# Patient Record
Sex: FEMALE | Race: OTHER | HISPANIC OR LATINO | Employment: UNEMPLOYED | URBAN - METROPOLITAN AREA
[De-identification: names, ages, dates, MRNs, and addresses within clinical notes are randomized per-mention and may not be internally consistent; named-entity substitution may affect disease eponyms.]

---

## 2022-01-10 ENCOUNTER — LAB VISIT (OUTPATIENT)
Dept: PRIMARY CARE CLINIC | Facility: CLINIC | Age: 27
End: 2022-01-10
Payer: OTHER GOVERNMENT

## 2022-01-10 DIAGNOSIS — Z20.822 CONTACT WITH AND (SUSPECTED) EXPOSURE TO COVID-19: ICD-10-CM

## 2022-01-10 LAB
CTP QC/QA: YES
SARS-COV-2 AG RESP QL IA.RAPID: NEGATIVE

## 2022-01-10 PROCEDURE — 87811 SARS-COV-2 COVID19 W/OPTIC: CPT

## 2022-02-24 ENCOUNTER — HOSPITAL ENCOUNTER (OUTPATIENT)
Facility: HOSPITAL | Age: 27
Discharge: HOME | End: 2022-02-28
Attending: EMERGENCY MEDICINE | Admitting: FAMILY MEDICINE

## 2022-02-24 DIAGNOSIS — G43.109 MIGRAINE WITH AURA AND WITHOUT STATUS MIGRAINOSUS, NOT INTRACTABLE: ICD-10-CM

## 2022-02-24 DIAGNOSIS — R10.9 ABDOMINAL PAIN, UNSPECIFIED ABDOMINAL LOCATION: Primary | ICD-10-CM

## 2022-02-24 DIAGNOSIS — K76.1: ICD-10-CM

## 2022-02-24 DIAGNOSIS — R11.2 NAUSEA AND VOMITING, INTRACTABILITY OF VOMITING NOT SPECIFIED, UNSPECIFIED VOMITING TYPE: ICD-10-CM

## 2022-02-24 DIAGNOSIS — R93.2 ABNORMAL LIVER CT: ICD-10-CM

## 2022-02-24 DIAGNOSIS — I50.9 CHF (CONGESTIVE HEART FAILURE): ICD-10-CM

## 2022-02-24 DIAGNOSIS — N17.9 AKI (ACUTE KIDNEY INJURY): ICD-10-CM

## 2022-02-24 DIAGNOSIS — R07.9 CHEST PAIN: ICD-10-CM

## 2022-02-24 DIAGNOSIS — R10.84 GENERALIZED ABDOMINAL PAIN: ICD-10-CM

## 2022-02-24 PROBLEM — D64.9 ANEMIA: Status: ACTIVE | Noted: 2022-02-24

## 2022-02-24 LAB
ALBUMIN SERPL BCP-MCNC: 4.3 G/DL (ref 3.5–5.2)
ALP SERPL-CCNC: 60 U/L (ref 55–135)
ALT SERPL W/O P-5'-P-CCNC: 19 U/L (ref 10–44)
ANION GAP SERPL CALC-SCNC: 9 MMOL/L (ref 8–16)
AST SERPL-CCNC: 16 U/L (ref 10–40)
B-HCG UR QL: NEGATIVE
BACTERIA #/AREA URNS HPF: ABNORMAL /HPF
BASOPHILS # BLD AUTO: 0.04 K/UL (ref 0–0.2)
BASOPHILS NFR BLD: 0.4 % (ref 0–1.9)
BILIRUB SERPL-MCNC: 0.2 MG/DL (ref 0.1–1)
BILIRUB UR QL STRIP: NEGATIVE
BUN SERPL-MCNC: 14 MG/DL (ref 6–20)
CALCIUM SERPL-MCNC: 9.2 MG/DL (ref 8.7–10.5)
CHLORIDE SERPL-SCNC: 104 MMOL/L (ref 95–110)
CLARITY UR: CLEAR
CO2 SERPL-SCNC: 24 MMOL/L (ref 23–29)
COLOR UR: YELLOW
CREAT SERPL-MCNC: 1 MG/DL (ref 0.5–1.4)
CTP QC/QA: YES
CTP QC/QA: YES
DIFFERENTIAL METHOD: ABNORMAL
EOSINOPHIL # BLD AUTO: 0.1 K/UL (ref 0–0.5)
EOSINOPHIL NFR BLD: 0.6 % (ref 0–8)
ERYTHROCYTE [DISTWIDTH] IN BLOOD BY AUTOMATED COUNT: 12.5 % (ref 11.5–14.5)
EST. GFR  (AFRICAN AMERICAN): >60 ML/MIN/1.73 M^2
EST. GFR  (NON AFRICAN AMERICAN): >60 ML/MIN/1.73 M^2
GLUCOSE SERPL-MCNC: 120 MG/DL (ref 70–110)
GLUCOSE UR QL STRIP: NEGATIVE
HCT VFR BLD AUTO: 35.8 % (ref 37–48.5)
HGB BLD-MCNC: 11.9 G/DL (ref 12–16)
HGB UR QL STRIP: ABNORMAL
IMM GRANULOCYTES # BLD AUTO: 0.03 K/UL (ref 0–0.04)
IMM GRANULOCYTES NFR BLD AUTO: 0.3 % (ref 0–0.5)
INR PPP: 1 (ref 0.8–1.2)
KETONES UR QL STRIP: NEGATIVE
LEUKOCYTE ESTERASE UR QL STRIP: ABNORMAL
LIPASE SERPL-CCNC: 12 U/L (ref 4–60)
LYMPHOCYTES # BLD AUTO: 2.4 K/UL (ref 1–4.8)
LYMPHOCYTES NFR BLD: 26.8 % (ref 18–48)
MCH RBC QN AUTO: 33.1 PG (ref 27–31)
MCHC RBC AUTO-ENTMCNC: 33.2 G/DL (ref 32–36)
MCV RBC AUTO: 100 FL (ref 82–98)
MICROSCOPIC COMMENT: ABNORMAL
MONOCYTES # BLD AUTO: 0.5 K/UL (ref 0.3–1)
MONOCYTES NFR BLD: 5.7 % (ref 4–15)
NEUTROPHILS # BLD AUTO: 5.9 K/UL (ref 1.8–7.7)
NEUTROPHILS NFR BLD: 66.2 % (ref 38–73)
NITRITE UR QL STRIP: NEGATIVE
NRBC BLD-RTO: 0 /100 WBC
PH UR STRIP: 7 [PH] (ref 5–8)
PLATELET # BLD AUTO: 288 K/UL (ref 150–450)
PMV BLD AUTO: 9.3 FL (ref 9.2–12.9)
POTASSIUM SERPL-SCNC: 3.9 MMOL/L (ref 3.5–5.1)
PROT SERPL-MCNC: 7.4 G/DL (ref 6–8.4)
PROT UR QL STRIP: ABNORMAL
PROTHROMBIN TIME: 10.3 SEC (ref 9–12.5)
RBC # BLD AUTO: 3.59 M/UL (ref 4–5.4)
RBC #/AREA URNS HPF: >100 /HPF (ref 0–4)
SARS-COV-2 RDRP RESP QL NAA+PROBE: NEGATIVE
SODIUM SERPL-SCNC: 137 MMOL/L (ref 136–145)
SP GR UR STRIP: >1.03 (ref 1–1.03)
SQUAMOUS #/AREA URNS HPF: 0 /HPF
URN SPEC COLLECT METH UR: ABNORMAL
UROBILINOGEN UR STRIP-ACNC: NEGATIVE EU/DL
WBC # BLD AUTO: 8.95 K/UL (ref 3.9–12.7)
WBC #/AREA URNS HPF: 2 /HPF (ref 0–5)

## 2022-02-24 PROCEDURE — U0002 COVID-19 LAB TEST NON-CDC: HCPCS | Performed by: NURSE PRACTITIONER

## 2022-02-24 PROCEDURE — 63600175 PHARM REV CODE 636 W HCPCS: Performed by: NURSE PRACTITIONER

## 2022-02-24 PROCEDURE — 25500020 PHARM REV CODE 255: Performed by: NURSE PRACTITIONER

## 2022-02-24 PROCEDURE — 25000003 PHARM REV CODE 250

## 2022-02-24 PROCEDURE — 80053 COMPREHEN METABOLIC PANEL: CPT | Performed by: NURSE PRACTITIONER

## 2022-02-24 PROCEDURE — 96361 HYDRATE IV INFUSION ADD-ON: CPT

## 2022-02-24 PROCEDURE — 96375 TX/PRO/DX INJ NEW DRUG ADDON: CPT

## 2022-02-24 PROCEDURE — 85610 PROTHROMBIN TIME: CPT | Performed by: NURSE PRACTITIONER

## 2022-02-24 PROCEDURE — 80074 ACUTE HEPATITIS PANEL: CPT | Performed by: NURSE PRACTITIONER

## 2022-02-24 PROCEDURE — G0378 HOSPITAL OBSERVATION PER HR: HCPCS

## 2022-02-24 PROCEDURE — 85025 COMPLETE CBC W/AUTO DIFF WBC: CPT | Performed by: NURSE PRACTITIONER

## 2022-02-24 PROCEDURE — 25000003 PHARM REV CODE 250: Performed by: NURSE PRACTITIONER

## 2022-02-24 PROCEDURE — 99285 EMERGENCY DEPT VISIT HI MDM: CPT | Mod: 25

## 2022-02-24 PROCEDURE — 96374 THER/PROPH/DIAG INJ IV PUSH: CPT | Mod: 59

## 2022-02-24 PROCEDURE — 63600175 PHARM REV CODE 636 W HCPCS

## 2022-02-24 PROCEDURE — 81000 URINALYSIS NONAUTO W/SCOPE: CPT | Performed by: NURSE PRACTITIONER

## 2022-02-24 PROCEDURE — 81025 URINE PREGNANCY TEST: CPT | Performed by: NURSE PRACTITIONER

## 2022-02-24 PROCEDURE — 83690 ASSAY OF LIPASE: CPT | Performed by: NURSE PRACTITIONER

## 2022-02-24 RX ORDER — PROCHLORPERAZINE EDISYLATE 5 MG/ML
5 INJECTION INTRAMUSCULAR; INTRAVENOUS EVERY 6 HOURS PRN
Status: DISCONTINUED | OUTPATIENT
Start: 2022-02-24 | End: 2022-02-28 | Stop reason: HOSPADM

## 2022-02-24 RX ORDER — KETOROLAC TROMETHAMINE 30 MG/ML
15 INJECTION, SOLUTION INTRAMUSCULAR; INTRAVENOUS
Status: COMPLETED | OUTPATIENT
Start: 2022-02-24 | End: 2022-02-24

## 2022-02-24 RX ORDER — ACETAMINOPHEN 325 MG/1
650 TABLET ORAL EVERY 4 HOURS PRN
Status: DISCONTINUED | OUTPATIENT
Start: 2022-02-24 | End: 2022-02-28 | Stop reason: HOSPADM

## 2022-02-24 RX ORDER — SODIUM CHLORIDE 0.9 % (FLUSH) 0.9 %
10 SYRINGE (ML) INJECTION EVERY 8 HOURS PRN
Status: DISCONTINUED | OUTPATIENT
Start: 2022-02-24 | End: 2022-02-28 | Stop reason: HOSPADM

## 2022-02-24 RX ORDER — TALC
6 POWDER (GRAM) TOPICAL NIGHTLY PRN
Status: DISCONTINUED | OUTPATIENT
Start: 2022-02-24 | End: 2022-02-28 | Stop reason: HOSPADM

## 2022-02-24 RX ORDER — GLUCAGON 1 MG
1 KIT INJECTION
Status: DISCONTINUED | OUTPATIENT
Start: 2022-02-24 | End: 2022-02-28 | Stop reason: HOSPADM

## 2022-02-24 RX ORDER — ONDANSETRON 2 MG/ML
4 INJECTION INTRAMUSCULAR; INTRAVENOUS EVERY 8 HOURS PRN
Status: DISCONTINUED | OUTPATIENT
Start: 2022-02-24 | End: 2022-02-28 | Stop reason: HOSPADM

## 2022-02-24 RX ORDER — SODIUM CHLORIDE, SODIUM LACTATE, POTASSIUM CHLORIDE, CALCIUM CHLORIDE 600; 310; 30; 20 MG/100ML; MG/100ML; MG/100ML; MG/100ML
INJECTION, SOLUTION INTRAVENOUS CONTINUOUS
Status: ACTIVE | OUTPATIENT
Start: 2022-02-24 | End: 2022-02-25

## 2022-02-24 RX ORDER — SIMETHICONE 80 MG
1 TABLET,CHEWABLE ORAL 4 TIMES DAILY PRN
Status: DISCONTINUED | OUTPATIENT
Start: 2022-02-24 | End: 2022-02-28 | Stop reason: HOSPADM

## 2022-02-24 RX ORDER — MAG HYDROX/ALUMINUM HYD/SIMETH 200-200-20
30 SUSPENSION, ORAL (FINAL DOSE FORM) ORAL 4 TIMES DAILY PRN
Status: DISCONTINUED | OUTPATIENT
Start: 2022-02-24 | End: 2022-02-28 | Stop reason: HOSPADM

## 2022-02-24 RX ORDER — MORPHINE SULFATE 4 MG/ML
4 INJECTION, SOLUTION INTRAMUSCULAR; INTRAVENOUS
Status: COMPLETED | OUTPATIENT
Start: 2022-02-24 | End: 2022-02-24

## 2022-02-24 RX ORDER — IBUPROFEN 200 MG
24 TABLET ORAL
Status: DISCONTINUED | OUTPATIENT
Start: 2022-02-24 | End: 2022-02-28 | Stop reason: HOSPADM

## 2022-02-24 RX ORDER — SODIUM CHLORIDE 9 MG/ML
INJECTION, SOLUTION INTRAVENOUS CONTINUOUS
Status: DISCONTINUED | OUTPATIENT
Start: 2022-02-24 | End: 2022-02-24

## 2022-02-24 RX ORDER — ONDANSETRON 2 MG/ML
4 INJECTION INTRAMUSCULAR; INTRAVENOUS
Status: COMPLETED | OUTPATIENT
Start: 2022-02-24 | End: 2022-02-24

## 2022-02-24 RX ORDER — IBUPROFEN 200 MG
16 TABLET ORAL
Status: DISCONTINUED | OUTPATIENT
Start: 2022-02-24 | End: 2022-02-28 | Stop reason: HOSPADM

## 2022-02-24 RX ADMIN — ONDANSETRON 4 MG: 2 INJECTION INTRAMUSCULAR; INTRAVENOUS at 02:02

## 2022-02-24 RX ADMIN — SODIUM CHLORIDE, SODIUM LACTATE, POTASSIUM CHLORIDE, AND CALCIUM CHLORIDE 1000 ML: .6; .31; .03; .02 INJECTION, SOLUTION INTRAVENOUS at 08:02

## 2022-02-24 RX ADMIN — MORPHINE SULFATE 4 MG: 4 INJECTION INTRAVENOUS at 05:02

## 2022-02-24 RX ADMIN — KETOROLAC TROMETHAMINE 15 MG: 30 INJECTION, SOLUTION INTRAMUSCULAR at 03:02

## 2022-02-24 RX ADMIN — IOHEXOL 75 ML: 350 INJECTION, SOLUTION INTRAVENOUS at 03:02

## 2022-02-24 RX ADMIN — SODIUM CHLORIDE: 0.9 INJECTION, SOLUTION INTRAVENOUS at 08:02

## 2022-02-24 RX ADMIN — SODIUM CHLORIDE, SODIUM LACTATE, POTASSIUM CHLORIDE, AND CALCIUM CHLORIDE: .6; .31; .03; .02 INJECTION, SOLUTION INTRAVENOUS at 11:02

## 2022-02-24 RX ADMIN — SODIUM CHLORIDE 1000 ML: 0.9 INJECTION, SOLUTION INTRAVENOUS at 02:02

## 2022-02-24 NOTE — ED PROVIDER NOTES
Encounter Date: 2/24/2022       History     Chief Complaint   Patient presents with    Abdominal Pain     Generalized abd pain with n/v that began last night, last ate-3 hours ago, last BM this am, denies pain with urination, pain describes as cramping      27 yr old female presents to the ER with reports of right lower abd pain with radiation upward to the right upper quad that began last night. Pt states the pain is intense and intermittent however has been more constant over the past few hours. + nausea and vomiting. Denies diarrhea however states LBM was this morning with no blood reported. Denies fever. Pt states she is also on her cycle at this time and typically has mild cramping however nothing like present. No PMH reported.     The history is provided by the patient. The history is limited by a language barrier. A  was used.     Review of patient's allergies indicates:  No Known Allergies  No past medical history on file.  No past surgical history on file.  No family history on file.     Review of Systems   Constitutional: Negative for fever.   Respiratory: Negative for shortness of breath.    Cardiovascular: Negative for chest pain, palpitations and leg swelling.   Gastrointestinal: Positive for abdominal pain, nausea and vomiting.   Genitourinary: Positive for vaginal bleeding. Negative for dysuria.   Musculoskeletal: Negative for neck pain and neck stiffness.       Physical Exam     Initial Vitals [02/24/22 1334]   BP Pulse Resp Temp SpO2   (!) 148/91 75 18 97.7 °F (36.5 °C) 100 %      MAP       --         Physical Exam    Constitutional: She appears well-developed and well-nourished. She appears ill.   Uncomfortable in appearance   HENT:   Head: Normocephalic.   Right Ear: Hearing and tympanic membrane normal.   Left Ear: Hearing and tympanic membrane normal.   Nose: Nose normal.   Mouth/Throat: Oropharynx is clear and moist.   Eyes: Lids are normal. Pupils are equal, round, and  reactive to light.   Neck:   Normal range of motion.  Cardiovascular: Normal rate.   Pulmonary/Chest: Breath sounds normal. No respiratory distress. She has no wheezes. She has no rhonchi.   Abdominal: Abdomen is soft. There is abdominal tenderness in the right upper quadrant and right lower quadrant.   Musculoskeletal:         General: Normal range of motion.      Cervical back: Normal range of motion. No rigidity.     Neurological: She is alert and oriented to person, place, and time.   Skin: Skin is warm and dry. No rash noted.   Psychiatric: She has a normal mood and affect. Her behavior is normal. Judgment and thought content normal.         ED Course   Procedures  Labs Reviewed   URINALYSIS, REFLEX TO URINE CULTURE - Abnormal; Notable for the following components:       Result Value    Specific Gravity, UA >1.030 (*)     Protein, UA Trace (*)     Occult Blood UA 3+ (*)     Leukocytes, UA 1+ (*)     All other components within normal limits    Narrative:     Specimen Source->Urine   CBC W/ AUTO DIFFERENTIAL - Abnormal; Notable for the following components:    RBC 3.59 (*)     Hemoglobin 11.9 (*)     Hematocrit 35.8 (*)      (*)     MCH 33.1 (*)     All other components within normal limits   COMPREHENSIVE METABOLIC PANEL - Abnormal; Notable for the following components:    Glucose 120 (*)     All other components within normal limits   URINALYSIS MICROSCOPIC - Abnormal; Notable for the following components:    RBC, UA >100 (*)     All other components within normal limits    Narrative:     Specimen Source->Urine   LIPASE   PROTIME-INR   HEPATITIS PANEL, ACUTE   POCT URINE PREGNANCY   SARS-COV-2 RDRP GENE          Imaging Results           CT Abdomen Pelvis With Contrast (Final result)  Result time 02/24/22 16:30:45    Final result by Demario Rodriguez MD (02/24/22 16:30:45)                 Impression:      1. No localized findings identified to explain patient's symptoms of right lower quadrant pain.   "Specifically, appendix and terminal ileum appear normal.  2. Hepatomegaly with nonspecific periportal edema and heterogeneous parenchymal attenuation/slight "nutmeg" liver configuration.  Differential considerations more commonly seen in this age group can include sequela of acute hepatitis versus hepatic veno-occlusive versus sequela of Budd-Chiari syndrome/hepatic venous and/or IVC thrombosis, although difficult to confirm secondary to timing of contrast bolus and image acquisition.  Correlation with LFTs advised.  Sequela of congestive hepatopathy from CHF, constrictive pericarditis or pulmonary hypertension considered much less likely given the relative normal appearance of the base of the heart and lack of any significant basilar pulmonary edema or pleural effusion.  No discrete mass seen at the domitila hepatis to suggest a cause for potential obstruction of lymphatic drainage, and no discrete mass seen within the biliary system.  No definitive CT morphologic findings to suggest primary sclerosing cholangitis at this time.  3. Left hepatic lobe subcentimeter enhancing focus, too small to characterize.  4. Few additional findings as above.  This report was flagged in Epic as abnormal.      Electronically signed by: Demario Rodriguez MD  Date:    02/24/2022  Time:    16:30             Narrative:    EXAMINATION:  CT ABDOMEN PELVIS WITH CONTRAST    CLINICAL HISTORY:  RLQ abdominal pain (Age >= 14y);    TECHNIQUE:  Low dose axial images, sagittal and coronal reformations were obtained from the lung bases to the pubic symphysis following the IV administration of 75 mL of Omnipaque 350 .  Oral contrast was not given.    COMPARISON:  None.    FINDINGS:  Imaged lung bases are clear.  Base of the heart is within normal limits.    Liver is enlarged measuring 23 cm in maximum coronal length.  There is heterogeneous parenchymal attenuation with somewhat mottled pattern of contrast enhancement and patchy areas of decreased " enhancement of the liver periphery (nutmeg appearance) throughout the liver.  There is diffuse periportal edema.  Small geographic area of hypoattenuation at the anterior left hepatic lobe suggesting focal fatty infiltration.  2 mm enhancing focus at the lateral left hepatic lobe, which is too small to characterize.    Main portal vein appears patent.  Evaluation of the IVC is limited secondary to timing of contrast bolus mainly tailored for the portal venous system.  Hepatic veins could not be assessed.    Gallbladder appears somewhat contracted with suspected pericholecystic fluid versus asymmetric wall thickening along the lateral aspect.  No radiodense large gallstone seen.  No biliary ductal dilatation.    Pancreas, spleen, stomach, duodenum and bilateral adrenal glands are within normal limits.    Bilateral kidneys are normal in size, shape and location with relatively symmetric enhancement.  No hydronephrosis or significant perinephric stranding.  Ureters are nondilated.  Urinary bladder is suboptimally distended.  Uterus is retroflexed and tilted towards the right.  No adnexal mass definitively seen.  There is suspected small volume nonspecific free pelvic fluid, commonly physiologic in this age group.    No large volume abdominal ascites, free air or lymphadenopathy by CT criteria.    Appendix and terminal ileum are within normal limits.  Mild amount of scattered fecal material throughout the colon.  No evidence of bowel obstruction or inflammation.  No pneumatosis or portal venous gas.    Small fat containing umbilical hernia.    Osseous structures show minimal degenerative change without acute or destructive process seen.                                 Medications   morphine injection 4 mg (has no administration in time range)   ondansetron injection 4 mg (4 mg Intravenous Given 2/24/22 1423)   sodium chloride 0.9% bolus 1,000 mL (0 mLs Intravenous Stopped 2/24/22 1536)   ketorolac injection 15 mg (15 mg  Intravenous Given 2/24/22 1506)   iohexoL (OMNIPAQUE 350) injection 75 mL (75 mLs Intravenous Given 2/24/22 1534)     Medical Decision Making:   Clinical Tests:   Lab Tests: Reviewed and Ordered  Radiological Study: Ordered and Reviewed  Other:   I discussed test(s) with the performing physician.             ED Course as of 02/24/22 1735   Thu Feb 24, 2022   1610 CT is pending.  [DT]   1637 [facility] called at this time. VM left.  [DT]   1637 I spoke with the pt in great detail concerning her CT and labs findings. Pt states she has never had issues in the past with her liver. Awaiting a call back from Dr Davenport at this time. In addition, the pt will be given an additional dose of pain meds as she states the pain has returned.  [DT]   1656 [facility] paged.  [DT]   1715 [facility] is capped. Waiting on a call back from [facility]  which was paged 5 min ago [DT]   1715 Secure chat sent to Dr Davenport.  [DT]   1723 Spoke with Dr Davenport concerning the pts condition and CT findings. States he will be in touch with further orders.  [DT]   1725 [facility] paged again as I was on the line with Dr Davenport when they called back [DT]      ED Course User Index  [DT] Meghana Leigh NP             Clinical Impression:   Final diagnoses:  [R10.9] Abdominal pain, unspecified abdominal location (Primary)  [R93.2] Abnormal liver CT          ED Disposition Condition    Observation               Meghana Leigh NP  02/24/22 1727       Meghana Leigh NP  02/24/22 1736

## 2022-02-25 LAB
ALBUMIN SERPL BCP-MCNC: 3.2 G/DL (ref 3.5–5.2)
ALP SERPL-CCNC: 56 U/L (ref 55–135)
ALT SERPL W/O P-5'-P-CCNC: 48 U/L (ref 10–44)
ANION GAP SERPL CALC-SCNC: 7 MMOL/L (ref 8–16)
AST SERPL-CCNC: 32 U/L (ref 10–40)
BASOPHILS # BLD AUTO: 0.03 K/UL (ref 0–0.2)
BASOPHILS NFR BLD: 0.4 % (ref 0–1.9)
BILIRUB SERPL-MCNC: 0.4 MG/DL (ref 0.1–1)
BUN SERPL-MCNC: 7 MG/DL (ref 6–20)
CALCIUM SERPL-MCNC: 8.1 MG/DL (ref 8.7–10.5)
CHLORIDE SERPL-SCNC: 109 MMOL/L (ref 95–110)
CO2 SERPL-SCNC: 23 MMOL/L (ref 23–29)
CREAT SERPL-MCNC: 0.6 MG/DL (ref 0.5–1.4)
DIFFERENTIAL METHOD: ABNORMAL
EOSINOPHIL # BLD AUTO: 0.1 K/UL (ref 0–0.5)
EOSINOPHIL NFR BLD: 0.9 % (ref 0–8)
ERYTHROCYTE [DISTWIDTH] IN BLOOD BY AUTOMATED COUNT: 12.6 % (ref 11.5–14.5)
EST. GFR  (AFRICAN AMERICAN): >60 ML/MIN/1.73 M^2
EST. GFR  (NON AFRICAN AMERICAN): >60 ML/MIN/1.73 M^2
FOLATE SERPL-MCNC: 10.8 NG/ML (ref 4–24)
GLUCOSE SERPL-MCNC: 83 MG/DL (ref 70–110)
HAV IGM SERPL QL IA: NEGATIVE
HBV CORE IGM SERPL QL IA: NEGATIVE
HBV SURFACE AG SERPL QL IA: NEGATIVE
HCT VFR BLD AUTO: 31.9 % (ref 37–48.5)
HCV AB SERPL QL IA: NEGATIVE
HGB BLD-MCNC: 10.5 G/DL (ref 12–16)
IMM GRANULOCYTES # BLD AUTO: 0.03 K/UL (ref 0–0.04)
IMM GRANULOCYTES NFR BLD AUTO: 0.4 % (ref 0–0.5)
LYMPHOCYTES # BLD AUTO: 2.9 K/UL (ref 1–4.8)
LYMPHOCYTES NFR BLD: 38.2 % (ref 18–48)
MCH RBC QN AUTO: 33 PG (ref 27–31)
MCHC RBC AUTO-ENTMCNC: 32.9 G/DL (ref 32–36)
MCV RBC AUTO: 100 FL (ref 82–98)
MONOCYTES # BLD AUTO: 0.5 K/UL (ref 0.3–1)
MONOCYTES NFR BLD: 7 % (ref 4–15)
NEUTROPHILS # BLD AUTO: 4 K/UL (ref 1.8–7.7)
NEUTROPHILS NFR BLD: 53.1 % (ref 38–73)
NRBC BLD-RTO: 0 /100 WBC
PLATELET # BLD AUTO: 246 K/UL (ref 150–450)
PMV BLD AUTO: 9.8 FL (ref 9.2–12.9)
POTASSIUM SERPL-SCNC: 3.5 MMOL/L (ref 3.5–5.1)
PROT SERPL-MCNC: 5.7 G/DL (ref 6–8.4)
RBC # BLD AUTO: 3.18 M/UL (ref 4–5.4)
RETICS/RBC NFR AUTO: 2 % (ref 0.5–2.5)
SODIUM SERPL-SCNC: 139 MMOL/L (ref 136–145)
TSH SERPL DL<=0.005 MIU/L-ACNC: 0.81 UIU/ML (ref 0.4–4)
VIT B12 SERPL-MCNC: 768 PG/ML (ref 210–950)
WBC # BLD AUTO: 7.47 K/UL (ref 3.9–12.7)

## 2022-02-25 PROCEDURE — G0378 HOSPITAL OBSERVATION PER HR: HCPCS

## 2022-02-25 PROCEDURE — 80053 COMPREHEN METABOLIC PANEL: CPT

## 2022-02-25 PROCEDURE — 85045 AUTOMATED RETICULOCYTE COUNT: CPT

## 2022-02-25 PROCEDURE — 85025 COMPLETE CBC W/AUTO DIFF WBC: CPT

## 2022-02-25 PROCEDURE — 99204 PR OFFICE/OUTPT VISIT, NEW, LEVL IV, 45-59 MIN: ICD-10-PCS | Mod: ,,, | Performed by: INTERNAL MEDICINE

## 2022-02-25 PROCEDURE — 96361 HYDRATE IV INFUSION ADD-ON: CPT

## 2022-02-25 PROCEDURE — 82607 VITAMIN B-12: CPT

## 2022-02-25 PROCEDURE — 96375 TX/PRO/DX INJ NEW DRUG ADDON: CPT

## 2022-02-25 PROCEDURE — 63600175 PHARM REV CODE 636 W HCPCS: Performed by: INTERNAL MEDICINE

## 2022-02-25 PROCEDURE — 99204 OFFICE O/P NEW MOD 45 MIN: CPT | Mod: ,,, | Performed by: INTERNAL MEDICINE

## 2022-02-25 PROCEDURE — 63600175 PHARM REV CODE 636 W HCPCS

## 2022-02-25 PROCEDURE — 25000003 PHARM REV CODE 250

## 2022-02-25 PROCEDURE — 96376 TX/PRO/DX INJ SAME DRUG ADON: CPT

## 2022-02-25 PROCEDURE — 84443 ASSAY THYROID STIM HORMONE: CPT

## 2022-02-25 PROCEDURE — 82746 ASSAY OF FOLIC ACID SERUM: CPT

## 2022-02-25 RX ORDER — MORPHINE SULFATE 2 MG/ML
2 INJECTION, SOLUTION INTRAMUSCULAR; INTRAVENOUS EVERY 4 HOURS PRN
Status: DISCONTINUED | OUTPATIENT
Start: 2022-02-25 | End: 2022-02-28

## 2022-02-25 RX ORDER — MORPHINE SULFATE 2 MG/ML
2 INJECTION, SOLUTION INTRAMUSCULAR; INTRAVENOUS EVERY 6 HOURS PRN
Status: DISCONTINUED | OUTPATIENT
Start: 2022-02-25 | End: 2022-02-25

## 2022-02-25 RX ORDER — ACETAMINOPHEN 325 MG/1
325 TABLET ORAL
COMMUNITY

## 2022-02-25 RX ADMIN — MORPHINE SULFATE 2 MG: 2 INJECTION, SOLUTION INTRAMUSCULAR; INTRAVENOUS at 06:02

## 2022-02-25 RX ADMIN — ACETAMINOPHEN 650 MG: 325 TABLET ORAL at 06:02

## 2022-02-25 RX ADMIN — PROCHLORPERAZINE EDISYLATE 5 MG: 5 INJECTION INTRAMUSCULAR; INTRAVENOUS at 06:02

## 2022-02-25 RX ADMIN — ONDANSETRON 4 MG: 2 INJECTION INTRAMUSCULAR; INTRAVENOUS at 02:02

## 2022-02-25 RX ADMIN — MORPHINE SULFATE 2 MG: 2 INJECTION, SOLUTION INTRAMUSCULAR; INTRAVENOUS at 02:02

## 2022-02-25 NOTE — PHARMACY MED REC
"Admission Medication History     The home medication history was taken by Ana Fontanez CPhT.    Medication history obtained from,Patient verified via     You may go to "Admission" then "Reconcile Home Medications" tabs to review and/or act upon these items.      The home medication list has been updated by the Pharmacy department.    Please read ALL comments highlighted in yellow.    Please address this information as you see fit.     Feel free to contact us if you have any questions or require assistance.            Ana Fontanez CPhT.  Ext [phone]                  .          "

## 2022-02-25 NOTE — HPI
"Gladys Kenyon is a 26 y/o female with no medical history presents to [facility] ED with complaints of RUQ abdominal pain. The pain started 2/23 after she returned from work, sharp in nature, non radiating, and has gradually gotten worse. She tried tylenol for the pain with minimal relief, denies heavy tylenol use in the past. The pain is not related to eating or passing BM. She has had associated nausea and non bloody/non bilious vomiting. GI consulted for CT AP findings which showed hepatomegaly with onspecific periportal edema and heterogeneous parenchymal attenuation/slight "nutmeg" liver configuration. She denies family history of liver disease.          "

## 2022-02-25 NOTE — SUBJECTIVE & OBJECTIVE
No past medical history on file.    No past surgical history on file.    Review of patient's allergies indicates:  No Known Allergies    No current facility-administered medications on file prior to encounter.     No current outpatient medications on file prior to encounter.     Family History    None       Tobacco Use    Smoking status: Not on file    Smokeless tobacco: Not on file   Substance and Sexual Activity    Alcohol use: Not on file    Drug use: Not on file    Sexual activity: Not on file     Review of Systems   Constitutional:  Negative for chills, diaphoresis and fever.   HENT:  Negative for hearing loss and sore throat.    Eyes:  Negative for visual disturbance.   Respiratory:  Negative for cough and shortness of breath.    Cardiovascular:  Negative for chest pain and leg swelling.   Gastrointestinal:  Positive for abdominal pain, nausea and vomiting. Negative for diarrhea.   Genitourinary:  Negative for difficulty urinating and flank pain.   Musculoskeletal:  Negative for back pain.   Skin:  Negative for rash and wound.   Neurological:  Negative for dizziness, weakness and headaches.   Psychiatric/Behavioral:  Negative for agitation and confusion.    Objective:     Vital Signs (Most Recent):  Temp: 98 °F (36.7 °C) (02/24/22 2128)  Pulse: (!) 59 (02/24/22 2128)  Resp: 14 (02/24/22 2128)  BP: (!) 158/91 (02/24/22 2128)  SpO2: 100 % (02/24/22 2128)   Vital Signs (24h Range):  Temp:  [97.7 °F (36.5 °C)-98.1 °F (36.7 °C)] 98 °F (36.7 °C)  Pulse:  [57-76] 59  Resp:  [14-19] 14  SpO2:  [100 %] 100 %  BP: (125-158)/(75-91) 158/91     Weight: 64.4 kg (142 lb)  Body mass index is 21.77 kg/m².    Physical Exam  Vitals and nursing note reviewed.   Constitutional:       General: She is not in acute distress.     Appearance: Normal appearance. She is not ill-appearing.   HENT:      Head: Normocephalic and atraumatic.      Mouth/Throat:      Mouth: Mucous membranes are moist.   Eyes:      Extraocular Movements:  Extraocular movements intact.      Pupils: Pupils are equal, round, and reactive to light.   Cardiovascular:      Rate and Rhythm: Normal rate and regular rhythm.      Pulses: Normal pulses.      Heart sounds: Normal heart sounds. No murmur heard.    No friction rub. No gallop.   Pulmonary:      Effort: Pulmonary effort is normal. No respiratory distress.      Breath sounds: No wheezing or rhonchi.   Abdominal:      General: Bowel sounds are normal. There is no distension.      Palpations: Abdomen is soft.      Tenderness: There is abdominal tenderness in the right upper quadrant, right lower quadrant, epigastric area and left lower quadrant. There is no guarding or rebound.   Musculoskeletal:         General: No swelling.      Cervical back: Normal range of motion and neck supple.      Right lower leg: No edema.      Left lower leg: No edema.   Skin:     General: Skin is warm and dry.      Capillary Refill: Capillary refill takes less than 2 seconds.      Findings: No bruising, erythema or rash.   Neurological:      General: No focal deficit present.      Mental Status: She is alert and oriented to person, place, and time.   Psychiatric:         Mood and Affect: Mood normal.         Behavior: Behavior normal. Behavior is cooperative.         CRANIAL NERVES     CN III, IV, VI   Pupils are equal, round, and reactive to light.     Significant Labs: All pertinent labs within the past 24 hours have been reviewed.  CBC:   Recent Labs   Lab 02/24/22  1420   WBC 8.95   HGB 11.9*   HCT 35.8*        CMP:   Recent Labs   Lab 02/24/22  1420      K 3.9      CO2 24   *   BUN 14   CREATININE 1.0   CALCIUM 9.2   PROT 7.4   ALBUMIN 4.3   BILITOT 0.2   ALKPHOS 60   AST 16   ALT 19   ANIONGAP 9   EGFRNONAA >60     Coagulation:   Recent Labs   Lab 02/24/22  1734   INR 1.0     Urine Studies:   Recent Labs   Lab 02/24/22  1446   COLORU Yellow   APPEARANCEUA Clear   PHUR 7.0   SPECGRAV >1.030*   PROTEINUA Trace*    GLUCUA Negative   KETONESU Negative   BILIRUBINUA Negative   OCCULTUA 3+*   NITRITE Negative   UROBILINOGEN Negative   LEUKOCYTESUR 1+*   RBCUA >100*   WBCUA 2   BACTERIA Rare   SQUAMEPITHEL 0       Significant Imaging: I have reviewed all pertinent imaging results/findings within the past 24 hours.

## 2022-02-25 NOTE — H&P
[facility]  [facility]  History & Physical    Patient Name: Gladys Kenyon  MRN: [id number]  Patient Class: OP- Observation  Admission Date: 2/24/2022  Attending Physician: Loreto Stapleton MD   Primary Care Provider: Primary Doctor No         Patient information was obtained from patient and ER records.     Subjective:     Principal Problem:Generalized abdominal pain    Chief Complaint:   Chief Complaint   Patient presents with    Abdominal Pain     Generalized abd pain with n/v that began last night, last ate-3 hours ago, last BM this am, denies pain with urination, pain describes as cramping         HPI: Gladys Kenyon is a 28 y/o female with no known medical history presents to [facility] ED with complaints of abdominal pain. Patient reports she has been having intermittent and intense abdominal pain since yesterday but today her abdominal pain has progressively worsened. Patient reports abdominal pain is generalized and radiates to lower abdomen and right upper quadrant. Patient reports abdominal pain feels like cramps, is constant, and rates pain 8/10 on numeric pain scale. Patient reports she also had sudden nausea and vomiting around 4pm today. Patient denies bloody stools, diarrhea, or reynold-colored stools but state she had a burning sensation with her bowel movement today. Patient tried to take Pepto-Bismol but threw it up. Patient reports she is on her menstrual cycle and usually have mild abdominal cramping but the abdominal pain she is experiencing is worse than menstrual cramps. Patient denies fever, chills, SOB, chest pain, cough, palpitations, leg swelling, or urinary symptoms. Patient denies family history of liver disease. Patient denies alcohol or drug use. Patient does not take any medications. 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 GI consulted. Patient will be placed into hospital medicine observation services under my care in collaboration with Dr. Radha Garrido.       Corin #[id number] was utilized during my interview.       No past medical history on file.    No past surgical history on file.    Review of patient's allergies indicates:  No Known Allergies    No current facility-administered medications on file prior to encounter.     No current outpatient medications on file prior to encounter.     Family History    None       Tobacco Use    Smoking status: Not on file    Smokeless tobacco: Not on file   Substance and Sexual Activity    Alcohol use: Not on file    Drug use: Not on file    Sexual activity: Not on file     Review of Systems   Constitutional:  Negative for chills, diaphoresis and fever.   HENT:  Negative for hearing loss and sore throat.    Eyes:  Negative for visual disturbance.   Respiratory:  Negative for cough and shortness of breath.    Cardiovascular:  Negative for chest pain and leg swelling.   Gastrointestinal:  Positive for abdominal pain, nausea and vomiting. Negative for diarrhea.   Genitourinary:  Negative for difficulty urinating and flank pain.   Musculoskeletal:  Negative for back pain.   Skin:  Negative for rash and wound.   Neurological:  Negative for dizziness, weakness and headaches.   Psychiatric/Behavioral:  Negative for agitation and confusion.    Objective:     Vital Signs (Most Recent):  Temp: 98 °F (36.7 °C) (02/24/22 2128)  Pulse: (!) 59 (02/24/22 2128)  Resp: 14 (02/24/22 2128)  BP: (!) 158/91 (02/24/22 2128)  SpO2: 100 % (02/24/22 2128)   Vital Signs (24h Range):  Temp:  [97.7 °F (36.5 °C)-98.1 °F (36.7 °C)] 98 °F (36.7 °C)  Pulse:  [57-76] 59  Resp:  [14-19] 14  SpO2:  [100 %] 100 %  BP: (125-158)/(75-91) 158/91     Weight: 64.4 kg (142 lb)  Body mass index is 21.77 kg/m².    Physical Exam  Vitals and nursing note reviewed.   Constitutional:       General: She is not in acute distress.     Appearance: Normal appearance. She is not ill-appearing.   HENT:      Head: Normocephalic and atraumatic.      Mouth/Throat:       Mouth: Mucous membranes are moist.   Eyes:      Extraocular Movements: Extraocular movements intact.      Pupils: Pupils are equal, round, and reactive to light.   Cardiovascular:      Rate and Rhythm: Normal rate and regular rhythm.      Pulses: Normal pulses.      Heart sounds: Normal heart sounds. No murmur heard.    No friction rub. No gallop.   Pulmonary:      Effort: Pulmonary effort is normal. No respiratory distress.      Breath sounds: No wheezing or rhonchi.   Abdominal:      General: Bowel sounds are normal. There is no distension.      Palpations: Abdomen is soft.      Tenderness: There is abdominal tenderness in the right upper quadrant, right lower quadrant, epigastric area and left lower quadrant. There is no guarding or rebound.   Musculoskeletal:         General: No swelling.      Cervical back: Normal range of motion and neck supple.      Right lower leg: No edema.      Left lower leg: No edema.   Skin:     General: Skin is warm and dry.      Capillary Refill: Capillary refill takes less than 2 seconds.      Findings: No bruising, erythema or rash.   Neurological:      General: No focal deficit present.      Mental Status: She is alert and oriented to person, place, and time.   Psychiatric:         Mood and Affect: Mood normal.         Behavior: Behavior normal. Behavior is cooperative.         CRANIAL NERVES     CN III, IV, VI   Pupils are equal, round, and reactive to light.     Significant Labs: All pertinent labs within the past 24 hours have been reviewed.  CBC:   Recent Labs   Lab 02/24/22  1420   WBC 8.95   HGB 11.9*   HCT 35.8*        CMP:   Recent Labs   Lab 02/24/22  1420      K 3.9      CO2 24   *   BUN 14   CREATININE 1.0   CALCIUM 9.2   PROT 7.4   ALBUMIN 4.3   BILITOT 0.2   ALKPHOS 60   AST 16   ALT 19   ANIONGAP 9   EGFRNONAA >60     Coagulation:   Recent Labs   Lab 02/24/22  1734   INR 1.0     Urine Studies:   Recent Labs   Lab 02/24/22  1446   COLORU  "Yellow   APPEARANCEUA Clear   PHUR 7.0   SPECGRAV >1.030*   PROTEINUA Trace*   GLUCUA Negative   KETONESU Negative   BILIRUBINUA Negative   OCCULTUA 3+*   NITRITE Negative   UROBILINOGEN Negative   LEUKOCYTESUR 1+*   RBCUA >100*   WBCUA 2   BACTERIA Rare   SQUAMEPITHEL 0       Significant Imaging: I have reviewed all pertinent imaging results/findings within the past 24 hours.    Assessment/Plan:     * Generalized abdominal pain  -patient presents with mid-epigastric abdominal pain, radiating to lower abdomen and right upper quadrant with nausea and vomiting  -Received 2L bolus in ED   -Hepatomegaly with nonspecific periportal edema and heterogeneous parenchymal attenuation/slight "nutmeg" liver configuration; concerning for acute hepatitis vs veno-occlusive vs sequela of Budd-Chiari syndrome vs IVC thrombosis  -US liver revealed pericholecystic fluid, likely reactive and no cholelithiasis or sonographic evidence of acute cholecystitis.   -Liver function unremarkable; T.bili 0.2  -NPO for now; reassess in am  -MIVF x1 day  -anti-emetics for N/V  -pain management      Anemia  -H/H is 11.9/35.8, which is stable and consistent with previous laboratory measurements. Patient exhibits no signs or symptoms of acute bleeding  -Etiology unknown; no baseline to compare to  -RBC 3.59, , MCH 33.1   -Pending retic, B12, folate, and TSH  -No indication for blood transfusion  -Continue to monitor serial CBC  -Transfuse for Hgb <7 or if symptomatic        Nausea and vomiting  See generalized abdominal pain        VTE Risk Mitigation (From admission, onward)         Ordered     IP VTE LOW RISK PATIENT  Once         02/24/22 1747     Place sequential compression device  Until discontinued         02/24/22 1747                   Shweta Cuellar, YEN, ACNPC-AG, CCRN  Hospitalist  Department of Hospital Medicine  [facility]   [phone]    "

## 2022-02-25 NOTE — PROGRESS NOTES
[facility] Emergency Dept  [facility] Medicine  Progress Note    Patient Name: Gladys Kenyon  MRN: [id number]  Patient Class: OP- Observation   Admission Date: 2/24/2022  Length of Stay: 0 days  Attending Physician: Ben Batista MD  Primary Care Provider: Primary Doctor No        Subjective:     Principal Problem:Generalized abdominal pain        HPI:  Gladys Kenyon is a 26 y/o female with no known medical history presents to [facility] ED with complaints of abdominal pain. Patient reports she has been having intermittent and intense abdominal pain since yesterday but today her abdominal pain has progressively worsened. Patient reports abdominal pain is generalized and radiates to lower abdomen and right upper quadrant. Patient reports abdominal pain feels like cramps, is constant, and rates pain 8/10 on numeric pain scale. Patient reports she also had sudden nausea and vomiting around 4pm today. Patient denies bloody stools, diarrhea, or reynold-colored stools but state she had a burning sensation with her bowel movement today. Patient tried to take Pepto-Bismol but threw it up. Patient reports she is on her menstrual cycle and usually have mild abdominal cramping but the abdominal pain she is experiencing is worse than menstrual cramps. Patient denies fever, chills, SOB, chest pain, cough, palpitations, leg swelling, or urinary symptoms. Patient denies family history of liver disease. Patient denies alcohol or drug use. Patient does not take any medications. 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 GI consulted. Patient will be placed into hospital medicine observation services under my care in collaboration with Dr. Radha Garrido.      Corin #[id number] was utilized during my interview.       Overview/Hospital Course:  2/25- today her pain is 5/10 which is improved from 10/10.  No vomiting today.  GI following, will f/u with their recommendations. Will monitor condition and monitor labs.       Interval  History: see hosp course    Review of Systems   Constitutional:  Negative for chills, fatigue and fever.   HENT:  Negative for congestion, sore throat and trouble swallowing.    Respiratory:  Negative for cough, chest tightness, shortness of breath and wheezing.    Cardiovascular:  Negative for chest pain, palpitations and leg swelling.   Gastrointestinal:  Negative for abdominal pain, diarrhea, nausea and vomiting.   Endocrine: Negative for cold intolerance and heat intolerance.   Genitourinary:  Negative for difficulty urinating, dysuria and frequency.   Musculoskeletal:  Negative for back pain and gait problem.   Skin:  Negative for color change, rash and wound.   Neurological:  Negative for seizures, syncope and weakness.   Hematological:  Does not bruise/bleed easily.   Psychiatric/Behavioral:  Negative for agitation and confusion. The patient is not nervous/anxious.    Objective:     Vital Signs (Most Recent):  Temp: 98.1 °F (36.7 °C) (02/25/22 0900)  Pulse: (!) 53 (02/25/22 1200)  Resp: 20 (02/25/22 1424)  BP: 106/60 (02/25/22 1200)  SpO2: 100 % (02/25/22 1200)   Vital Signs (24h Range):  Temp:  [97.6 °F (36.4 °C)-98.2 °F (36.8 °C)] 98.1 °F (36.7 °C)  Pulse:  [53-89] 53  Resp:  [13-22] 20  SpO2:  [100 %] 100 %  BP: (106-158)/(60-91) 106/60     Weight: 64.4 kg (142 lb)  Body mass index is 21.77 kg/m².    Intake/Output Summary (Last 24 hours) at 2/25/2022 1426  Last data filed at 2/25/2022 1210  Gross per 24 hour   Intake 2222.47 ml   Output --   Net 2222.47 ml      Physical Exam  Vitals and nursing note reviewed.   Constitutional:       General: She is not in acute distress.     Appearance: Normal appearance. She is normal weight. She is not ill-appearing.   HENT:      Head: Normocephalic and atraumatic.      Mouth/Throat:      Mouth: Mucous membranes are moist.   Eyes:      Extraocular Movements: Extraocular movements intact.      Pupils: Pupils are equal, round, and reactive to light.   Cardiovascular:       "Rate and Rhythm: Normal rate and regular rhythm.      Heart sounds: No murmur heard.    No gallop.   Pulmonary:      Effort: Pulmonary effort is normal. No respiratory distress.      Breath sounds: Normal breath sounds. No wheezing or rales.   Abdominal:      General: Bowel sounds are normal. There is no distension.      Tenderness: There is no abdominal tenderness. There is no guarding.   Musculoskeletal:         General: No tenderness.      Cervical back: Normal range of motion and neck supple.      Right lower leg: No edema.      Left lower leg: No edema.   Skin:     General: Skin is warm.      Findings: No erythema, lesion or rash.   Neurological:      Mental Status: She is alert and oriented to person, place, and time. Mental status is at baseline.      Motor: No weakness.      Coordination: Coordination normal.   Psychiatric:         Mood and Affect: Mood normal.       Significant Labs: All pertinent labs within the past 24 hours have been reviewed.  CBC:   Recent Labs   Lab 02/24/22  1420 02/25/22  0456   WBC 8.95 7.47   HGB 11.9* 10.5*   HCT 35.8* 31.9*    246     CMP:   Recent Labs   Lab 02/24/22  1420 02/25/22  0456    139   K 3.9 3.5    109   CO2 24 23   * 83   BUN 14 7   CREATININE 1.0 0.6   CALCIUM 9.2 8.1*   PROT 7.4 5.7*   ALBUMIN 4.3 3.2*   BILITOT 0.2 0.4   ALKPHOS 60 56   AST 16 32   ALT 19 48*   ANIONGAP 9 7*   EGFRNONAA >60 >60       Significant Imaging: I have reviewed all pertinent imaging results/findings within the past 24 hours.      Assessment/Plan:      * Generalized abdominal pain  -patient presents with mid-epigastric abdominal pain, radiating to lower abdomen and right upper quadrant with nausea and vomiting  -Received 2L bolus in ED   -Hepatomegaly with nonspecific periportal edema and heterogeneous parenchymal attenuation/slight "nutmeg" liver configuration; concerning for acute hepatitis vs veno-occlusive vs sequela of Budd-Chiari syndrome vs IVC " thrombosis  -US liver revealed pericholecystic fluid, likely reactive and no cholelithiasis or sonographic evidence of acute cholecystitis.   -Liver function unremarkable; T.bili 0.2    Plan:  -no procedures planned by GI therefore will start reg diet  -may need to f/u with Hepatology at [facility] after dc  -MIVF x1 day  -anti-emetics for N/V  -pain management ---> add morphine 2mg iv q6 PRN and tylenol       Anemia  -H/H is 11.9/35.8, which is stable and consistent with previous laboratory measurements. Patient exhibits no signs or symptoms of acute bleeding  -Etiology unknown; no baseline to compare to  -RBC 3.59, , MCH 33.1   -Pending retic, B12, folate, and TSH  -No indication for blood transfusion  -Continue to monitor serial CBC  -Transfuse for Hgb <7 or if symptomatic        Nausea and vomiting  See generalized abdominal pain        VTE Risk Mitigation (From admission, onward)         Ordered     IP VTE LOW RISK PATIENT  Once         02/24/22 1747     Place sequential compression device  Until discontinued         02/24/22 1747                Discharge Planning   KENDRICK:      Code Status: Full Code   Is the patient medically ready for discharge?:     Reason for patient still in hospital (select all that apply): Consult recommendations                     Ben Batista MD  Department of Hospital Medicine   [facility] - Emergency Dept

## 2022-02-25 NOTE — PLAN OF CARE
Future Appointments   Date Time Provider Department Center   3/14/2022  2:00 PM Bernadine Mojica NP [facility] HEPAT [address]   3/22/2022  1:00 PM Fariha Queen MD [facility]

## 2022-02-25 NOTE — HOSPITAL COURSE
Tunisian only speaking patient    2/25- today her pain is 5/10 which is improved from 10/10.  No vomiting today.  GI following, will f/u with their recommendations. Will monitor condition and monitor labs.     2/26- had Czech speaking nurse at bedside today to assist, she is having pain after she eats solid, was fine last night but this morning had strawberries and started having abd pain again, RUQ and epigastric.  10/10 pain without morphine 2mg and 1/10 pain after morphine.  Frequency is good lasting 4-5 hrs.  She doesn't ask for much pain meds per nurse.  Last dose was overnight.  LFTs continue to be wnl.  HIV pending.  I will change her diet to clears.  Continue to monitor for improvement, get better pain control, and f/u with GI reccs and plans.      2/27- pt last night had a stroke code called for blurred vision, facial numbness, jaw pain, ct head was normal, was given toradol and ivf.  She is still having headaches.  Does have h/o migraines that she said she takes ibuprofen for.  Will order that today.  I witness her eating clear liquids and has not had pain from it and will try soft food today.  If pain is better control and if can tolerate soft diet we can start planning dc with f/u with [facility] hepatology Monday.

## 2022-02-25 NOTE — SUBJECTIVE & OBJECTIVE
Past Medical History:   Diagnosis Date    No known problems        Past Surgical History:   Procedure Laterality Date    none         Review of patient's allergies indicates:  No Known Allergies  Family History       Problem Relation (Age of Onset)    Hypertension Mother, Father          Tobacco Use    Smoking status: Never Smoker    Smokeless tobacco: Not on file   Substance and Sexual Activity    Alcohol use: Never    Drug use: Never    Sexual activity: Yes     Review of Systems   Constitutional:  Negative for activity change, fatigue and fever.   HENT:  Negative for congestion and sore throat.    Eyes:  Negative for photophobia and visual disturbance.   Respiratory:  Negative for chest tightness and shortness of breath.    Cardiovascular:  Negative for chest pain and palpitations.   Gastrointestinal:  Positive for abdominal pain, nausea and vomiting. Negative for blood in stool, constipation and diarrhea.   Genitourinary:  Negative for difficulty urinating and dysuria.   Musculoskeletal:  Negative for arthralgias and myalgias.   Skin:  Negative for color change.   Neurological:  Negative for syncope and weakness.   Hematological:  Does not bruise/bleed easily.   Objective:     Vital Signs (Most Recent):  Temp: 98.2 °F (36.8 °C) (02/25/22 0623)  Pulse: 75 (02/25/22 0800)  Resp: 20 (02/25/22 0800)  BP: 117/65 (02/25/22 0800)  SpO2: 100 % (02/25/22 0800) Vital Signs (24h Range):  Temp:  [97.6 °F (36.4 °C)-98.2 °F (36.8 °C)] 98.2 °F (36.8 °C)  Pulse:  [57-82] 75  Resp:  [14-22] 20  SpO2:  [100 %] 100 %  BP: (117-158)/(65-91) 117/65     Weight: 64.4 kg (142 lb) (02/24/22 1334)  Body mass index is 21.77 kg/m².      Intake/Output Summary (Last 24 hours) at 2/25/2022 0905  Last data filed at 2/25/2022 0749  Gross per 24 hour   Intake 1815.38 ml   Output --   Net 1815.38 ml       Lines/Drains/Airways       Peripheral Intravenous Line  Duration                  Peripheral IV - Single Lumen 02/24/22 1415 20 G Left  Antecubital <1 day         Peripheral IV - Single Lumen 02/24/22 2030 20 G Anterior;Proximal;Right Forearm <1 day                    Physical Exam  Constitutional:       Appearance: She is normal weight.   HENT:      Head: Normocephalic and atraumatic.      Nose: Nose normal.      Mouth/Throat:      Mouth: Mucous membranes are moist.      Pharynx: Oropharynx is clear.   Eyes:      Extraocular Movements: Extraocular movements intact.      Conjunctiva/sclera: Conjunctivae normal.   Cardiovascular:      Rate and Rhythm: Normal rate and regular rhythm.      Heart sounds: No murmur heard.  Pulmonary:      Effort: Pulmonary effort is normal. No respiratory distress.      Breath sounds: Normal breath sounds. No wheezing.   Abdominal:      General: Abdomen is flat. Bowel sounds are normal.      Palpations: Abdomen is soft.      Tenderness: There is abdominal tenderness (RUQ TTP). There is no guarding or rebound.   Musculoskeletal:         General: Normal range of motion.      Cervical back: Normal range of motion and neck supple.   Skin:     General: Skin is warm and dry.   Neurological:      General: No focal deficit present.      Mental Status: She is alert and oriented to person, place, and time.   Psychiatric:         Mood and Affect: Mood normal.         Behavior: Behavior normal.       Significant Labs:  CBC:   Recent Labs   Lab 02/24/22  1420 02/25/22  0456   WBC 8.95 7.47   HGB 11.9* 10.5*   HCT 35.8* 31.9*    246     CMP:   Recent Labs   Lab 02/25/22  0456   GLU 83   CALCIUM 8.1*   ALBUMIN 3.2*   PROT 5.7*      K 3.5   CO2 23      BUN 7   CREATININE 0.6   ALKPHOS 56   ALT 48*   AST 32   BILITOT 0.4     Coagulation:   Recent Labs   Lab 02/24/22  1734   INR 1.0       Significant Imaging:  CT AP:   1. No localized findings identified to explain patient's symptoms of right lower quadrant pain.  Specifically, appendix and terminal ileum appear normal.  2. Hepatomegaly with nonspecific periportal edema  "and heterogeneous parenchymal attenuation/slight "nutmeg" liver configuration.  Differential considerations more commonly seen in this age group can include sequela of acute hepatitis versus hepatic veno-occlusive versus sequela of Budd-Chiari syndrome/hepatic venous and/or IVC thrombosis, although difficult to confirm secondary to timing of contrast bolus and image acquisition.  Correlation with LFTs advised.  Sequela of congestive hepatopathy from CHF, constrictive pericarditis or pulmonary hypertension considered much less likely given the relative normal appearance of the base of the heart and lack of any significant basilar pulmonary edema or pleural effusion.  No discrete mass seen at the domitila hepatis to suggest a cause for potential obstruction of lymphatic drainage, and no discrete mass seen within the biliary system.  No definitive CT morphologic findings to suggest primary sclerosing cholangitis at this time.    US liver with doppler:   The liver measures 17.3 cm, upper limits of normal in size and demonstrates normal echotexture.  No intra or extrahepatic bile duct dilatation. The common duct measures 2 mm. The middle, right, and left hepatic veins are patent and unremarkable. The main, right, and left branches of the portal vein demonstrate hepatopedal flow and are unremarkable. The gallbladder is otherwise unremarkable. The hepatic arterial system is unremarkable. The celiac artery is unremarkable. The IVC is unremarkable.   "

## 2022-02-25 NOTE — ASSESSMENT & PLAN NOTE
"Gladys Kenyon is a 26 y/o female with no medical history presents to [facility] ED with complaints of 3 day history of RUQ abdominal pain, gradual onset, intermittent and not related to eating or passing BM. She has had associated nausea and non bloody/non bilious vomiting. GI consulted for CT AP findings which showed hepatomegaly with onspecific periportal edema and heterogeneous parenchymal attenuation/slight "nutmeg" liver configuration. HDS, AST/ALT 32/48, Tbili 0.4, Alk phos 56, Hb 11.5, INR 1.0. Duplex liver US showed patent flow in hepatic arterial system, hep veins, protal vein and IVC and no gall bladder pathology. Acute hepatitis unlikely given normal liver profile, duplex liver US ruled out thrombotic pathology. Liver congestion 2/2 to CHF also unlikely given lack of pulm edema/SOB/orthopnea/peripheral edema. DDx includes nodular regenerative hyperplasia     Plan  - Continue symptomatic care  - Check HIV  - Would likely need liver biopsy for dx, will discuss case with [facility] hepatology  "

## 2022-02-25 NOTE — ASSESSMENT & PLAN NOTE
"-patient presents with mid-epigastric abdominal pain, radiating to lower abdomen and right upper quadrant with nausea and vomiting  -Received 2L bolus in ED   -Hepatomegaly with nonspecific periportal edema and heterogeneous parenchymal attenuation/slight "nutmeg" liver configuration; concerning for acute hepatitis vs veno-occlusive vs sequela of Budd-Chiari syndrome vs IVC thrombosis  -US liver revealed pericholecystic fluid, likely reactive and no cholelithiasis or sonographic evidence of acute cholecystitis.   -Liver function unremarkable; T.bili 0.2    Plan:  -no procedures planned by GI therefore will start reg diet  -may need to f/u with Hepatology at [facility] after dc  -MIVF x1 day  -anti-emetics for N/V  -pain management ---> add morphine 2mg iv q6 PRN and tylenol     "

## 2022-02-25 NOTE — ASSESSMENT & PLAN NOTE
-H/H is 11.9/35.8, which is stable and consistent with previous laboratory measurements. Patient exhibits no signs or symptoms of acute bleeding  -Etiology unknown; no baseline to compare to  -RBC 3.59, , MCH 33.1   -Pending retic, B12, folate, and TSH  -No indication for blood transfusion  -Continue to monitor serial CBC  -Transfuse for Hgb <7 or if symptomatic

## 2022-02-25 NOTE — ED NOTES
Patient c/o a headache so I paged the [facility] team to request something for her headache. Awaiting call back.

## 2022-02-25 NOTE — PLAN OF CARE
"SW notified of pt's visit to ED. Per MD note:    "Check HIV  - Discussed case with [facility] hepatology, will defer liver biopsy at this time given normal liver panel   - Will need follow up with [facility] hepatology on discharge "      SW will follow up with hepatology appointment, case management will continue pt throughout transitions of care.       02/25/22 8774   Discharge Assessment   Assessment Type Discharge Planning Brief Assessment     "

## 2022-02-25 NOTE — SUBJECTIVE & OBJECTIVE
Interval History: see hosp course    Review of Systems   Constitutional:  Negative for chills, fatigue and fever.   HENT:  Negative for congestion, sore throat and trouble swallowing.    Respiratory:  Negative for cough, chest tightness, shortness of breath and wheezing.    Cardiovascular:  Negative for chest pain, palpitations and leg swelling.   Gastrointestinal:  Negative for abdominal pain, diarrhea, nausea and vomiting.   Endocrine: Negative for cold intolerance and heat intolerance.   Genitourinary:  Negative for difficulty urinating, dysuria and frequency.   Musculoskeletal:  Negative for back pain and gait problem.   Skin:  Negative for color change, rash and wound.   Neurological:  Negative for seizures, syncope and weakness.   Hematological:  Does not bruise/bleed easily.   Psychiatric/Behavioral:  Negative for agitation and confusion. The patient is not nervous/anxious.    Objective:     Vital Signs (Most Recent):  Temp: 98.1 °F (36.7 °C) (02/25/22 0900)  Pulse: (!) 53 (02/25/22 1200)  Resp: 20 (02/25/22 1424)  BP: 106/60 (02/25/22 1200)  SpO2: 100 % (02/25/22 1200)   Vital Signs (24h Range):  Temp:  [97.6 °F (36.4 °C)-98.2 °F (36.8 °C)] 98.1 °F (36.7 °C)  Pulse:  [53-89] 53  Resp:  [13-22] 20  SpO2:  [100 %] 100 %  BP: (106-158)/(60-91) 106/60     Weight: 64.4 kg (142 lb)  Body mass index is 21.77 kg/m².    Intake/Output Summary (Last 24 hours) at 2/25/2022 1426  Last data filed at 2/25/2022 1210  Gross per 24 hour   Intake 2222.47 ml   Output --   Net 2222.47 ml      Physical Exam  Vitals and nursing note reviewed.   Constitutional:       General: She is not in acute distress.     Appearance: Normal appearance. She is normal weight. She is not ill-appearing.   HENT:      Head: Normocephalic and atraumatic.      Mouth/Throat:      Mouth: Mucous membranes are moist.   Eyes:      Extraocular Movements: Extraocular movements intact.      Pupils: Pupils are equal, round, and reactive to light.    Cardiovascular:      Rate and Rhythm: Normal rate and regular rhythm.      Heart sounds: No murmur heard.    No gallop.   Pulmonary:      Effort: Pulmonary effort is normal. No respiratory distress.      Breath sounds: Normal breath sounds. No wheezing or rales.   Abdominal:      General: Bowel sounds are normal. There is no distension.      Tenderness: There is no abdominal tenderness. There is no guarding.   Musculoskeletal:         General: No tenderness.      Cervical back: Normal range of motion and neck supple.      Right lower leg: No edema.      Left lower leg: No edema.   Skin:     General: Skin is warm.      Findings: No erythema, lesion or rash.   Neurological:      Mental Status: She is alert and oriented to person, place, and time. Mental status is at baseline.      Motor: No weakness.      Coordination: Coordination normal.   Psychiatric:         Mood and Affect: Mood normal.       Significant Labs: All pertinent labs within the past 24 hours have been reviewed.  CBC:   Recent Labs   Lab 02/24/22  1420 02/25/22  0456   WBC 8.95 7.47   HGB 11.9* 10.5*   HCT 35.8* 31.9*    246     CMP:   Recent Labs   Lab 02/24/22  1420 02/25/22  0456    139   K 3.9 3.5    109   CO2 24 23   * 83   BUN 14 7   CREATININE 1.0 0.6   CALCIUM 9.2 8.1*   PROT 7.4 5.7*   ALBUMIN 4.3 3.2*   BILITOT 0.2 0.4   ALKPHOS 60 56   AST 16 32   ALT 19 48*   ANIONGAP 9 7*   EGFRNONAA >60 >60       Significant Imaging: I have reviewed all pertinent imaging results/findings within the past 24 hours.

## 2022-02-25 NOTE — ASSESSMENT & PLAN NOTE
"-patient presents with mid-epigastric abdominal pain, radiating to lower abdomen and right upper quadrant with nausea and vomiting  -Received 2L bolus in ED   -Hepatomegaly with nonspecific periportal edema and heterogeneous parenchymal attenuation/slight "nutmeg" liver configuration; concerning for acute hepatitis vs veno-occlusive vs sequela of Budd-Chiari syndrome vs IVC thrombosis  -US liver revealed pericholecystic fluid, likely reactive and no cholelithiasis or sonographic evidence of acute cholecystitis.   -Liver function unremarkable; T.bili 0.2  -NPO for now; reassess in am  -MIVF x1 day  -anti-emetics for N/V  -pain management    "

## 2022-02-25 NOTE — CONSULTS
"[facility] - Emergency Dept  Gastroenterology  Consult Note    Patient Name: Gladys Kenyon  MRN: [id number]  Admission Date: 2/24/2022  Hospital Length of Stay: 0 days  Code Status: Full Code   Attending Provider: Ben Batista MD   Consulting Provider: Alcon Gunn MD, Roderick Davenport MD  Primary Care Physician: Primary Doctor No  Principal Problem:Generalized abdominal pain    Inpatient consult to Gastroenterology  Consult performed by: Alcon Gunn MD  Consult ordered by: Meghana Leigh NP        Subjective:     HPI:  Gladys Kenyon is a 28 y/o female with no medical history presents to [facility] ED with complaints of RUQ abdominal pain. The pain started 2/23 after she returned from work, sharp in nature, non radiating, and has gradually gotten worse. She tried tylenol for the pain with minimal relief, denies heavy tylenol use in the past. The pain is not related to eating or passing BM. She has had associated nausea and non bloody/non bilious vomiting. GI consulted for CT AP findings which showed hepatomegaly with onspecific periportal edema and heterogeneous parenchymal attenuation/slight "nutmeg" liver configuration. She denies family history of liver disease.        Past Medical History:   Diagnosis Date    No known problems        Past Surgical History:   Procedure Laterality Date    none         Review of patient's allergies indicates:  No Known Allergies  Family History       Problem Relation (Age of Onset)    Hypertension Mother, Father          Tobacco Use    Smoking status: Never Smoker    Smokeless tobacco: Not on file   Substance and Sexual Activity    Alcohol use: Never    Drug use: Never    Sexual activity: Yes     Review of Systems   Constitutional:  Negative for activity change, fatigue and fever.   HENT:  Negative for congestion and sore throat.    Eyes:  Negative for photophobia and visual disturbance.   Respiratory:  Negative for chest tightness and shortness of breath.    Cardiovascular:  Negative for " chest pain and palpitations.   Gastrointestinal:  Positive for abdominal pain, nausea and vomiting. Negative for blood in stool, constipation and diarrhea.   Genitourinary:  Negative for difficulty urinating and dysuria.   Musculoskeletal:  Negative for arthralgias and myalgias.   Skin:  Negative for color change.   Neurological:  Negative for syncope and weakness.   Hematological:  Does not bruise/bleed easily.   Objective:     Vital Signs (Most Recent):  Temp: 98.2 °F (36.8 °C) (02/25/22 0623)  Pulse: 75 (02/25/22 0800)  Resp: 20 (02/25/22 0800)  BP: 117/65 (02/25/22 0800)  SpO2: 100 % (02/25/22 0800) Vital Signs (24h Range):  Temp:  [97.6 °F (36.4 °C)-98.2 °F (36.8 °C)] 98.2 °F (36.8 °C)  Pulse:  [57-82] 75  Resp:  [14-22] 20  SpO2:  [100 %] 100 %  BP: (117-158)/(65-91) 117/65     Weight: 64.4 kg (142 lb) (02/24/22 1334)  Body mass index is 21.77 kg/m².      Intake/Output Summary (Last 24 hours) at 2/25/2022 0905  Last data filed at 2/25/2022 0749  Gross per 24 hour   Intake 1815.38 ml   Output --   Net 1815.38 ml       Lines/Drains/Airways       Peripheral Intravenous Line  Duration                  Peripheral IV - Single Lumen 02/24/22 1415 20 G Left Antecubital <1 day         Peripheral IV - Single Lumen 02/24/22 2030 20 G Anterior;Proximal;Right Forearm <1 day                    Physical Exam  Constitutional:       Appearance: She is normal weight.   HENT:      Head: Normocephalic and atraumatic.      Nose: Nose normal.      Mouth/Throat:      Mouth: Mucous membranes are moist.      Pharynx: Oropharynx is clear.   Eyes:      Extraocular Movements: Extraocular movements intact.      Conjunctiva/sclera: Conjunctivae normal.   Cardiovascular:      Rate and Rhythm: Normal rate and regular rhythm.      Heart sounds: No murmur heard.  Pulmonary:      Effort: Pulmonary effort is normal. No respiratory distress.      Breath sounds: Normal breath sounds. No wheezing.   Abdominal:      General: Abdomen is flat. Bowel  "sounds are normal.      Palpations: Abdomen is soft.      Tenderness: There is abdominal tenderness (RUQ TTP). There is no guarding or rebound.   Musculoskeletal:         General: Normal range of motion.      Cervical back: Normal range of motion and neck supple.   Skin:     General: Skin is warm and dry.   Neurological:      General: No focal deficit present.      Mental Status: She is alert and oriented to person, place, and time.   Psychiatric:         Mood and Affect: Mood normal.         Behavior: Behavior normal.       Significant Labs:  CBC:   Recent Labs   Lab 02/24/22  1420 02/25/22  0456   WBC 8.95 7.47   HGB 11.9* 10.5*   HCT 35.8* 31.9*    246     CMP:   Recent Labs   Lab 02/25/22  0456   GLU 83   CALCIUM 8.1*   ALBUMIN 3.2*   PROT 5.7*      K 3.5   CO2 23      BUN 7   CREATININE 0.6   ALKPHOS 56   ALT 48*   AST 32   BILITOT 0.4     Coagulation:   Recent Labs   Lab 02/24/22  1734   INR 1.0       Significant Imaging:  CT AP:   1. No localized findings identified to explain patient's symptoms of right lower quadrant pain.  Specifically, appendix and terminal ileum appear normal.  2. Hepatomegaly with nonspecific periportal edema and heterogeneous parenchymal attenuation/slight "nutmeg" liver configuration.  Differential considerations more commonly seen in this age group can include sequela of acute hepatitis versus hepatic veno-occlusive versus sequela of Budd-Chiari syndrome/hepatic venous and/or IVC thrombosis, although difficult to confirm secondary to timing of contrast bolus and image acquisition.  Correlation with LFTs advised.  Sequela of congestive hepatopathy from CHF, constrictive pericarditis or pulmonary hypertension considered much less likely given the relative normal appearance of the base of the heart and lack of any significant basilar pulmonary edema or pleural effusion.  No discrete mass seen at the domitila hepatis to suggest a cause for potential obstruction of " "lymphatic drainage, and no discrete mass seen within the biliary system.  No definitive CT morphologic findings to suggest primary sclerosing cholangitis at this time.    US liver with doppler:   The liver measures 17.3 cm, upper limits of normal in size and demonstrates normal echotexture.  No intra or extrahepatic bile duct dilatation. The common duct measures 2 mm. The middle, right, and left hepatic veins are patent and unremarkable. The main, right, and left branches of the portal vein demonstrate hepatopedal flow and are unremarkable. The gallbladder is otherwise unremarkable. The hepatic arterial system is unremarkable. The celiac artery is unremarkable. The IVC is unremarkable.     Assessment/Plan:     * Generalized abdominal pain  Gladys Kenyon is a 28 y/o female with no medical history presents to [facility] ED with complaints of 3 day history of RUQ abdominal pain, gradual onset, intermittent and not related to eating or passing BM. She has had associated nausea and non bloody/non bilious vomiting. GI consulted for CT AP findings which showed hepatomegaly with onspecific periportal edema and heterogeneous parenchymal attenuation/slight "nutmeg" liver configuration. HDS, AST/ALT 32/48, Tbili 0.4, Alk phos 56, Hb 11.5, INR 1.0. Duplex liver US showed patent flow in hepatic arterial system, hep veins, protal vein and IVC and no gall bladder pathology. Acute hepatitis unlikely given normal liver profile, duplex liver US ruled out thrombotic pathology. Liver congestion 2/2 to CHF also unlikely given lack of pulm edema/SOB/orthopnea/peripheral edema. DDx includes nodular regenerative hyperplasia     Plan  - Continue symptomatic care  - Check HIV  - Discussed case with [facility] hepatology, will defer liver biopsy at this time given normal liver panel   - Will need follow up with [facility] hepatology on discharge       Thank you for your consult. I will follow-up with patient. Please contact us if you have any additional " "questions.       Alcon Gunn MD ([place])         "

## 2022-02-25 NOTE — HPI
Gladys Kenyon is a 26 y/o female with no known medical history presents to [facility] ED with complaints of abdominal pain. Patient reports she has been having intermittent and intense abdominal pain since yesterday but today her abdominal pain has progressively worsened. Patient reports abdominal pain is generalized and radiates to lower abdomen and right upper quadrant. Patient reports abdominal pain feels like cramps, is constant, and rates pain 8/10 on numeric pain scale. Patient reports she also had sudden nausea and vomiting around 4pm today. Patient denies bloody stools, diarrhea, or reynold-colored stools but state she had a burning sensation with her bowel movement today. Patient tried to take Pepto-Bismol but threw it up. Patient reports she is on her menstrual cycle and usually have mild abdominal cramping but the abdominal pain she is experiencing is worse than menstrual cramps. Patient denies fever, chills, SOB, chest pain, cough, palpitations, leg swelling, or urinary symptoms. Patient denies family history of liver disease. Patient denies alcohol or drug use. Patient does not take any medications. 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 GI consulted. Patient will be placed into hospital medicine observation services under my care in collaboration with Dr. Radha Garrido.      Corin #[id number] was utilized during my interview.

## 2022-02-26 PROBLEM — G43.109 MIGRAINE WITH AURA AND WITHOUT STATUS MIGRAINOSUS, NOT INTRACTABLE: Status: ACTIVE | Noted: 2022-02-26

## 2022-02-26 LAB
ALBUMIN SERPL BCP-MCNC: 3.6 G/DL (ref 3.5–5.2)
ALP SERPL-CCNC: 66 U/L (ref 55–135)
ALT SERPL W/O P-5'-P-CCNC: 42 U/L (ref 10–44)
ANION GAP SERPL CALC-SCNC: 9 MMOL/L (ref 8–16)
AORTIC ROOT ANNULUS: 2.78 CM
ASCENDING AORTA: 2.47 CM
AST SERPL-CCNC: 22 U/L (ref 10–40)
AV INDEX (PROSTH): 0.71
AV MEAN GRADIENT: 3 MMHG
AV PEAK GRADIENT: 6 MMHG
AV VALVE AREA: 2.11 CM2
AV VELOCITY RATIO: 0.68
BASOPHILS # BLD AUTO: 0.03 K/UL (ref 0–0.2)
BASOPHILS NFR BLD: 0.4 % (ref 0–1.9)
BILIRUB SERPL-MCNC: 0.5 MG/DL (ref 0.1–1)
BSA FOR ECHO PROCEDURE: 1.81 M2
BUN SERPL-MCNC: 7 MG/DL (ref 6–20)
CALCIUM SERPL-MCNC: 8.9 MG/DL (ref 8.7–10.5)
CHLORIDE SERPL-SCNC: 107 MMOL/L (ref 95–110)
CO2 SERPL-SCNC: 25 MMOL/L (ref 23–29)
CREAT SERPL-MCNC: 0.6 MG/DL (ref 0.5–1.4)
CV ECHO LV RWT: 0.36 CM
DIFFERENTIAL METHOD: ABNORMAL
DOP CALC AO PEAK VEL: 1.2 M/S
DOP CALC AO VTI: 25.8 CM
DOP CALC LVOT AREA: 3 CM2
DOP CALC LVOT DIAMETER: 1.94 CM
DOP CALC LVOT PEAK VEL: 0.82 M/S
DOP CALC LVOT STROKE VOLUME: 54.42 CM3
DOP CALCLVOT PEAK VEL VTI: 18.42 CM
E WAVE DECELERATION TIME: 246.11 MSEC
E/A RATIO: 1.81
E/E' RATIO: 7.91 M/S
ECHO LV POSTERIOR WALL: 0.75 CM (ref 0.6–1.1)
EJECTION FRACTION: 55 %
EOSINOPHIL # BLD AUTO: 0.1 K/UL (ref 0–0.5)
EOSINOPHIL NFR BLD: 0.6 % (ref 0–8)
ERYTHROCYTE [DISTWIDTH] IN BLOOD BY AUTOMATED COUNT: 12.3 % (ref 11.5–14.5)
EST. GFR  (AFRICAN AMERICAN): >60 ML/MIN/1.73 M^2
EST. GFR  (NON AFRICAN AMERICAN): >60 ML/MIN/1.73 M^2
FRACTIONAL SHORTENING: 38 % (ref 28–44)
GLUCOSE SERPL-MCNC: 81 MG/DL (ref 70–110)
HCT VFR BLD AUTO: 34.3 % (ref 37–48.5)
HGB BLD-MCNC: 11.5 G/DL (ref 12–16)
IMM GRANULOCYTES # BLD AUTO: 0.03 K/UL (ref 0–0.04)
IMM GRANULOCYTES NFR BLD AUTO: 0.4 % (ref 0–0.5)
INTERVENTRICULAR SEPTUM: 0.8 CM (ref 0.6–1.1)
IVRT: 94.2 MSEC
LA MAJOR: 4.32 CM
LA MINOR: 4.28 CM
LA WIDTH: 4.19 CM
LEFT ATRIUM SIZE: 3.04 CM
LEFT ATRIUM VOLUME INDEX MOD: 14.8 ML/M2
LEFT ATRIUM VOLUME INDEX: 25.7 ML/M2
LEFT ATRIUM VOLUME MOD: 26.72 CM3
LEFT ATRIUM VOLUME: 46.55 CM3
LEFT INTERNAL DIMENSION IN SYSTOLE: 2.57 CM (ref 2.1–4)
LEFT VENTRICLE DIASTOLIC VOLUME INDEX: 42.56 ML/M2
LEFT VENTRICLE DIASTOLIC VOLUME: 77.04 ML
LEFT VENTRICLE MASS INDEX: 53 G/M2
LEFT VENTRICLE SYSTOLIC VOLUME INDEX: 13.2 ML/M2
LEFT VENTRICLE SYSTOLIC VOLUME: 23.85 ML
LEFT VENTRICULAR INTERNAL DIMENSION IN DIASTOLE: 4.17 CM (ref 3.5–6)
LEFT VENTRICULAR MASS: 95.98 G
LV LATERAL E/E' RATIO: 7.91 M/S
LV SEPTAL E/E' RATIO: 7.91 M/S
LYMPHOCYTES # BLD AUTO: 2.2 K/UL (ref 1–4.8)
LYMPHOCYTES NFR BLD: 26.8 % (ref 18–48)
MCH RBC QN AUTO: 33 PG (ref 27–31)
MCHC RBC AUTO-ENTMCNC: 33.5 G/DL (ref 32–36)
MCV RBC AUTO: 98 FL (ref 82–98)
MONOCYTES # BLD AUTO: 0.5 K/UL (ref 0.3–1)
MONOCYTES NFR BLD: 6.5 % (ref 4–15)
MV A" WAVE DURATION": 14.84 MSEC
MV PEAK A VEL: 0.48 M/S
MV PEAK E VEL: 0.87 M/S
MV STENOSIS PRESSURE HALF TIME: 71.37 MS
MV VALVE AREA P 1/2 METHOD: 3.08 CM2
NEUTROPHILS # BLD AUTO: 5.5 K/UL (ref 1.8–7.7)
NEUTROPHILS NFR BLD: 65.3 % (ref 38–73)
NRBC BLD-RTO: 0 /100 WBC
PISA TR MAX VEL: 2.42 M/S
PLATELET # BLD AUTO: 265 K/UL (ref 150–450)
PMV BLD AUTO: 9.8 FL (ref 9.2–12.9)
POCT GLUCOSE: 110 MG/DL (ref 70–110)
POTASSIUM SERPL-SCNC: 3.4 MMOL/L (ref 3.5–5.1)
PROT SERPL-MCNC: 6.5 G/DL (ref 6–8.4)
PULM VEIN S/D RATIO: 1.5
PV PEAK D VEL: 0.28 M/S
PV PEAK S VEL: 0.42 M/S
RA MAJOR: 4.75 CM
RA PRESSURE: 3 MMHG
RA WIDTH: 3.88 CM
RBC # BLD AUTO: 3.49 M/UL (ref 4–5.4)
RIGHT VENTRICULAR END-DIASTOLIC DIMENSION: 2.72 CM
RV TISSUE DOPPLER FREE WALL SYSTOLIC VELOCITY 1 (APICAL 4 CHAMBER VIEW): 11.44 CM/S
SODIUM SERPL-SCNC: 141 MMOL/L (ref 136–145)
STJ: 2.47 CM
TDI LATERAL: 0.11 M/S
TDI SEPTAL: 0.11 M/S
TDI: 0.11 M/S
TR MAX PG: 23 MMHG
TRICUSPID ANNULAR PLANE SYSTOLIC EXCURSION: 3.19 CM
TV REST PULMONARY ARTERY PRESSURE: 26 MMHG
WBC # BLD AUTO: 8.33 K/UL (ref 3.9–12.7)

## 2022-02-26 PROCEDURE — 63600175 PHARM REV CODE 636 W HCPCS

## 2022-02-26 PROCEDURE — G0378 HOSPITAL OBSERVATION PER HR: HCPCS

## 2022-02-26 PROCEDURE — G0426 PR INPT TELEHEALTH CONSULT 50M: ICD-10-PCS | Mod: GT,,, | Performed by: PSYCHIATRY & NEUROLOGY

## 2022-02-26 PROCEDURE — 25000003 PHARM REV CODE 250: Performed by: INTERNAL MEDICINE

## 2022-02-26 PROCEDURE — 96361 HYDRATE IV INFUSION ADD-ON: CPT

## 2022-02-26 PROCEDURE — 87389 HIV-1 AG W/HIV-1&-2 AB AG IA: CPT | Performed by: INTERNAL MEDICINE

## 2022-02-26 PROCEDURE — 25000003 PHARM REV CODE 250

## 2022-02-26 PROCEDURE — 80053 COMPREHEN METABOLIC PANEL: CPT | Performed by: INTERNAL MEDICINE

## 2022-02-26 PROCEDURE — 86790 VIRUS ANTIBODY NOS: CPT | Performed by: STUDENT IN AN ORGANIZED HEALTH CARE EDUCATION/TRAINING PROGRAM

## 2022-02-26 PROCEDURE — 87350 HEPATITIS BE AG IA: CPT | Performed by: STUDENT IN AN ORGANIZED HEALTH CARE EDUCATION/TRAINING PROGRAM

## 2022-02-26 PROCEDURE — 36415 COLL VENOUS BLD VENIPUNCTURE: CPT | Performed by: INTERNAL MEDICINE

## 2022-02-26 PROCEDURE — 86704 HEP B CORE ANTIBODY TOTAL: CPT | Performed by: STUDENT IN AN ORGANIZED HEALTH CARE EDUCATION/TRAINING PROGRAM

## 2022-02-26 PROCEDURE — 86677 HELICOBACTER PYLORI ANTIBODY: CPT | Performed by: INTERNAL MEDICINE

## 2022-02-26 PROCEDURE — 85025 COMPLETE CBC W/AUTO DIFF WBC: CPT | Performed by: INTERNAL MEDICINE

## 2022-02-26 PROCEDURE — 36415 COLL VENOUS BLD VENIPUNCTURE: CPT | Performed by: STUDENT IN AN ORGANIZED HEALTH CARE EDUCATION/TRAINING PROGRAM

## 2022-02-26 PROCEDURE — 63600175 PHARM REV CODE 636 W HCPCS: Performed by: INTERNAL MEDICINE

## 2022-02-26 PROCEDURE — 86707 HEPATITIS BE ANTIBODY: CPT | Performed by: STUDENT IN AN ORGANIZED HEALTH CARE EDUCATION/TRAINING PROGRAM

## 2022-02-26 PROCEDURE — 63600175 PHARM REV CODE 636 W HCPCS: Performed by: NURSE PRACTITIONER

## 2022-02-26 PROCEDURE — G0426 INPT/ED TELECONSULT50: HCPCS | Mod: GT,,, | Performed by: PSYCHIATRY & NEUROLOGY

## 2022-02-26 PROCEDURE — 86706 HEP B SURFACE ANTIBODY: CPT | Performed by: STUDENT IN AN ORGANIZED HEALTH CARE EDUCATION/TRAINING PROGRAM

## 2022-02-26 PROCEDURE — 96376 TX/PRO/DX INJ SAME DRUG ADON: CPT

## 2022-02-26 RX ORDER — KETOROLAC TROMETHAMINE 30 MG/ML
15 INJECTION, SOLUTION INTRAMUSCULAR; INTRAVENOUS ONCE
Status: DISCONTINUED | OUTPATIENT
Start: 2022-02-27 | End: 2022-02-27

## 2022-02-26 RX ORDER — PANTOPRAZOLE SODIUM 40 MG/1
40 TABLET, DELAYED RELEASE ORAL DAILY
Status: DISCONTINUED | OUTPATIENT
Start: 2022-02-26 | End: 2022-02-28 | Stop reason: HOSPADM

## 2022-02-26 RX ORDER — POTASSIUM CHLORIDE 20 MEQ/1
40 TABLET, EXTENDED RELEASE ORAL ONCE
Status: COMPLETED | OUTPATIENT
Start: 2022-02-26 | End: 2022-02-26

## 2022-02-26 RX ADMIN — ONDANSETRON 4 MG: 2 INJECTION INTRAMUSCULAR; INTRAVENOUS at 10:02

## 2022-02-26 RX ADMIN — MORPHINE SULFATE 2 MG: 2 INJECTION, SOLUTION INTRAMUSCULAR; INTRAVENOUS at 02:02

## 2022-02-26 RX ADMIN — SODIUM CHLORIDE, SODIUM LACTATE, POTASSIUM CHLORIDE, AND CALCIUM CHLORIDE 1000 ML: .6; .31; .03; .02 INJECTION, SOLUTION INTRAVENOUS at 11:02

## 2022-02-26 RX ADMIN — POTASSIUM CHLORIDE 40 MEQ: 1500 TABLET, EXTENDED RELEASE ORAL at 11:02

## 2022-02-26 RX ADMIN — ACETAMINOPHEN 650 MG: 325 TABLET ORAL at 05:02

## 2022-02-26 RX ADMIN — SIMETHICONE 80 MG: 80 TABLET, CHEWABLE ORAL at 02:02

## 2022-02-26 RX ADMIN — MORPHINE SULFATE 2 MG: 2 INJECTION, SOLUTION INTRAMUSCULAR; INTRAVENOUS at 10:02

## 2022-02-26 RX ADMIN — ACETAMINOPHEN 650 MG: 325 TABLET ORAL at 02:02

## 2022-02-26 RX ADMIN — PROCHLORPERAZINE EDISYLATE 5 MG: 5 INJECTION INTRAMUSCULAR; INTRAVENOUS at 02:02

## 2022-02-26 RX ADMIN — ACETAMINOPHEN 650 MG: 325 TABLET ORAL at 10:02

## 2022-02-26 RX ADMIN — PANTOPRAZOLE SODIUM 40 MG: 40 TABLET, DELAYED RELEASE ORAL at 11:02

## 2022-02-26 NOTE — PROGRESS NOTES
[facility] Medicine  Progress Note    Patient Name: Gladys Kenyon  MRN: [id number]  Patient Class: OP- Observation   Admission Date: 2/24/2022  Length of Stay: 0 days  Attending Physician: Ben Batista MD  Primary Care Provider: Primary Doctor No        Subjective:     Principal Problem:Generalized abdominal pain        HPI:  Gladys Kenyon is a 28 y/o female with no known medical history presents to [facility] ED with complaints of abdominal pain. Patient reports she has been having intermittent and intense abdominal pain since yesterday but today her abdominal pain has progressively worsened. Patient reports abdominal pain is generalized and radiates to lower abdomen and right upper quadrant. Patient reports abdominal pain feels like cramps, is constant, and rates pain 8/10 on numeric pain scale. Patient reports she also had sudden nausea and vomiting around 4pm today. Patient denies bloody stools, diarrhea, or reynold-colored stools but state she had a burning sensation with her bowel movement today. Patient tried to take Pepto-Bismol but threw it up. Patient reports she is on her menstrual cycle and usually have mild abdominal cramping but the abdominal pain she is experiencing is worse than menstrual cramps. Patient denies fever, chills, SOB, chest pain, cough, palpitations, leg swelling, or urinary symptoms. Patient denies family history of liver disease. Patient denies alcohol or drug use. Patient does not take any medications. 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 GI consulted. Patient will be placed into hospital medicine observation services under my care in collaboration with Dr. Radha Garrido.      Corin #[id number] was utilized during my interview.       Overview/Hospital Course:  2/25- today her pain is 5/10 which is improved from 10/10.  No vomiting today.  GI following, will f/u with their recommendations. Will monitor condition and monitor labs.     2/26- had Barb[place]  speaking nurse at bedside today to assist, she is having pain after she eats solid, was fine last night but this morning had strawberries and started having abd pain again, RUQ and epigastric.  10/10 pain without morphine 2mg and 1/10 pain after morphine.  Frequency is good lasting 4-5 hrs.  She doesn't ask for much pain meds per nurse.  Last dose was overnight.  LFTs continue to be wnl.  HIV pending.  I will change her diet to clears.  Continue to monitor for improvement, get better pain control, and f/u with GI reccs and plans.        Interval History: see hosp course    Review of Systems   Constitutional:  Negative for chills, fatigue and fever.   HENT:  Negative for congestion, sore throat and trouble swallowing.    Respiratory:  Negative for cough, chest tightness, shortness of breath and wheezing.    Cardiovascular:  Negative for chest pain, palpitations and leg swelling.   Gastrointestinal:  Negative for abdominal pain, diarrhea, nausea and vomiting.   Endocrine: Negative for cold intolerance and heat intolerance.   Genitourinary:  Negative for difficulty urinating, dysuria and frequency.   Musculoskeletal:  Negative for back pain and gait problem.   Skin:  Negative for color change, rash and wound.   Neurological:  Negative for seizures, syncope and weakness.   Hematological:  Does not bruise/bleed easily.   Psychiatric/Behavioral:  Negative for agitation and confusion. The patient is not nervous/anxious.    Objective:     Vital Signs (Most Recent):  Temp: 97.3 °F (36.3 °C) (02/26/22 0816)  Pulse: 62 (02/26/22 0816)  Resp: 18 (02/26/22 1031)  BP: 129/77 (02/26/22 0816)  SpO2: 100 % (02/26/22 0816) Vital Signs (24h Range):  Temp:  [97.3 °F (36.3 °C)-98.2 °F (36.8 °C)] 97.3 °F (36.3 °C)  Pulse:  [53-88] 62  Resp:  [15-20] 18  SpO2:  [99 %-100 %] 100 %  BP: (106-142)/(60-93) 129/77     Weight: 68.9 kg (151 lb 12.8 oz)  Body mass index is 23.27 kg/m².    Intake/Output Summary (Last 24 hours) at 2/26/2022  1039  Last data filed at 2/25/2022 1427  Gross per 24 hour   Intake 590.73 ml   Output --   Net 590.73 ml      Physical Exam  Vitals and nursing note reviewed.   Constitutional:       General: She is not in acute distress.     Appearance: Normal appearance. She is normal weight. She is not ill-appearing.   HENT:      Head: Normocephalic and atraumatic.      Mouth/Throat:      Mouth: Mucous membranes are moist.   Eyes:      Extraocular Movements: Extraocular movements intact.      Pupils: Pupils are equal, round, and reactive to light.   Cardiovascular:      Rate and Rhythm: Normal rate and regular rhythm.      Heart sounds: No murmur heard.    No gallop.   Pulmonary:      Effort: Pulmonary effort is normal. No respiratory distress.      Breath sounds: Normal breath sounds. No wheezing or rales.   Abdominal:      General: Bowel sounds are normal. There is no distension.      Tenderness: There is abdominal tenderness. There is no guarding.   Musculoskeletal:         General: No tenderness.      Cervical back: Normal range of motion and neck supple.      Right lower leg: No edema.      Left lower leg: No edema.   Skin:     General: Skin is warm.      Findings: No erythema, lesion or rash.   Neurological:      Mental Status: She is alert and oriented to person, place, and time. Mental status is at baseline.      Motor: No weakness.      Coordination: Coordination normal.   Psychiatric:         Mood and Affect: Mood normal.       Significant Labs: All pertinent labs within the past 24 hours have been reviewed.  CBC:   Recent Labs   Lab 02/24/22  1420 02/25/22  0456 02/26/22  0418   WBC 8.95 7.47 8.33   HGB 11.9* 10.5* 11.5*   HCT 35.8* 31.9* 34.3*    246 265     CMP:   Recent Labs   Lab 02/24/22  1420 02/25/22  0456 02/26/22  0418    139 141   K 3.9 3.5 3.4*    109 107   CO2 24 23 25   * 83 81   BUN 14 7 7   CREATININE 1.0 0.6 0.6   CALCIUM 9.2 8.1* 8.9   PROT 7.4 5.7* 6.5   ALBUMIN 4.3 3.2* 3.6  "  BILITOT 0.2 0.4 0.5   ALKPHOS 60 56 66   AST 16 32 22   ALT 19 48* 42   ANIONGAP 9 7* 9   EGFRNONAA >60 >60 >60       Significant Imaging: I have reviewed all pertinent imaging results/findings within the past 24 hours.  CT: I have reviewed all pertinent results/findings within the past 24 hours and my personal findings are:     Echo: I have reviewed all pertinent results/findings within the past 24 hours and my personal findings are:     U/S: I have reviewed all pertinent results/findings within the past 24 hours and my personal findings are:         Assessment/Plan:      * Generalized abdominal pain  -patient presents with mid-epigastric abdominal pain, radiating to lower abdomen and right upper quadrant with nausea and vomiting  -Received 2L bolus in ED   -Hepatomegaly with nonspecific periportal edema and heterogeneous parenchymal attenuation/slight "nutmeg" liver configuration; concerning for acute hepatitis vs veno-occlusive vs sequela of Budd-Chiari syndrome vs IVC thrombosis  -US liver revealed pericholecystic fluid, likely reactive and no cholelithiasis or sonographic evidence of acute cholecystitis.   -Liver function unremarkable; T.bili 0.2    Plan:  -change to clear diet due to pain after eating solids this morning   -may need to f/u with Hepatology at [facility] after dc  -MIVF x1 day  -anti-emetics for N/V  -pain management ---> morphine and tylenol prn is controlling her pain       Anemia  -H/H is 11.9/35.8, which is stable and consistent with previous laboratory measurements. Patient exhibits no signs or symptoms of acute bleeding  -Etiology unknown; no baseline to compare to  -RBC 3.59, , MCH 33.1   -Pending retic, B12, folate, and TSH  -No indication for blood transfusion  -Continue to monitor serial CBC  -Transfuse for Hgb <7 or if symptomatic        Nausea and vomiting  Anti emetics prn        VTE Risk Mitigation (From admission, onward)         Ordered     IP VTE LOW RISK PATIENT  Once         " 02/24/22 1747     Place sequential compression device  Until discontinued         02/24/22 1747                Discharge Planning   KENDRICK:      Code Status: Full Code   Is the patient medically ready for discharge?:     Reason for patient still in hospital (select all that apply): Patient trending condition                     Ben Batista MD  Department of [facility] Medicine   [facility]

## 2022-02-26 NOTE — ASSESSMENT & PLAN NOTE
"-patient presents with mid-epigastric abdominal pain, radiating to lower abdomen and right upper quadrant with nausea and vomiting  -Received 2L bolus in ED   -Hepatomegaly with nonspecific periportal edema and heterogeneous parenchymal attenuation/slight "nutmeg" liver configuration; concerning for acute hepatitis vs veno-occlusive vs sequela of Budd-Chiari syndrome vs IVC thrombosis  -US liver revealed pericholecystic fluid, likely reactive and no cholelithiasis or sonographic evidence of acute cholecystitis.   -Liver function unremarkable; T.bili 0.2    Plan:  -change to clear diet due to pain after eating solids this morning   -may need to f/u with Hepatology at [facility] after dc  -MIVF x1 day  -anti-emetics for N/V  -pain management ---> morphine and tylenol prn is controlling her pain     "

## 2022-02-26 NOTE — PLAN OF CARE
"Plan of care reviewed with the patient. Given Tylenol 650 mg for headache 6/10. Fall and safety precautions are in place. Patient is on Telemetry monitoring with Sinus Bradycardia, no true "red alarms" noted in the shift. Patient not reported any acute distress in the shift. Advised the patient to call for assistance. Continued monitoring the patient.  "

## 2022-02-26 NOTE — PLAN OF CARE
02/25/22 1823   Admission   Initial VN Admission Questions Complete   Communication Issues?   (language)

## 2022-02-26 NOTE — PROGRESS NOTES
"[facility]    CC: abdominal pain    HPI 27 y.o. female with no PMH GI consulted for acute, severe, right upper quadrant abdominal pain associated with nausea and emesis.    Past Medical History  None    Review of Systems  General ROS: negative for chills, fever or weight loss  Cardiovascular ROS: no chest pain or dyspnea on exertion  Gastrointestinal ROS: +abdominal pain, nausea, emesis. No change in bowel habits, or black/ bloody stools    Physical Examination  /77 (BP Location: Left arm, Patient Position: Lying)   Pulse 62   Temp 97.3 °F (36.3 °C) (Oral)   Resp 18   Ht 5' 7.72" (1.72 m)   Wt 68.9 kg (151 lb 12.8 oz)   SpO2 100%   Breastfeeding No   BMI 23.27 kg/m²   General appearance: alert, cooperative, no distress  HENT: Normocephalic, atraumatic, neck symmetrical, no nasal discharge   Abdomen: soft, tender RUQ and epigastrum; non-distended, dullness to percussion, bowel sounds normoactive  Neuro: A&Ox3, normal sensation, moves all extremities      Recent Labs   Lab 02/26/22  0418   WBC 8.33   RBC 3.49*   HGB 11.5*   HCT 34.3*      MCV 98   MCH 33.0*   MCHC 33.5       Recent Labs   Lab 02/26/22  0418      K 3.4*      CO2 25   GLU 81   BUN 7   CREATININE 0.6     Recent Labs   Lab 02/26/22  0418   PROT 6.5   ALBUMIN 3.6   BILITOT 0.5   AST 22   ALT 42   ALKPHOS 66       Review and summarization of old records    Assessment:   27 y.o. female with no PMH GI consulted for acute, severe, right upper quadrant abdominal pain associated with nausea and emesis. CT AP findings which showed hepatomegaly with nonspecific periportal edema and heterogeneous parenchymal attenuation/slight "nutmeg" liver. Patent blood flow in liver on U/S.    No signs of liver failure or biliary obstruction.    HDS, afebrile. Stable Hb, normal WBC, normal plts, normal INR. Normal T bili, AST/ALT, AlkPhos. Normal lipase on admit    Plan:  - symptomatic care  - TTE still pending  - daily CMP, CBC, INR  - " will need follow up with [facility]      Attestation to follow which may defer from above plan. Please appreciate.    Jose Wilson MD  [facility] Fellow

## 2022-02-26 NOTE — SUBJECTIVE & OBJECTIVE
Interval History: see hosp course    Review of Systems   Constitutional:  Negative for chills, fatigue and fever.   HENT:  Negative for congestion, sore throat and trouble swallowing.    Respiratory:  Negative for cough, chest tightness, shortness of breath and wheezing.    Cardiovascular:  Negative for chest pain, palpitations and leg swelling.   Gastrointestinal:  Negative for abdominal pain, diarrhea, nausea and vomiting.   Endocrine: Negative for cold intolerance and heat intolerance.   Genitourinary:  Negative for difficulty urinating, dysuria and frequency.   Musculoskeletal:  Negative for back pain and gait problem.   Skin:  Negative for color change, rash and wound.   Neurological:  Negative for seizures, syncope and weakness.   Hematological:  Does not bruise/bleed easily.   Psychiatric/Behavioral:  Negative for agitation and confusion. The patient is not nervous/anxious.    Objective:     Vital Signs (Most Recent):  Temp: 97.3 °F (36.3 °C) (02/26/22 0816)  Pulse: 62 (02/26/22 0816)  Resp: 18 (02/26/22 1031)  BP: 129/77 (02/26/22 0816)  SpO2: 100 % (02/26/22 0816) Vital Signs (24h Range):  Temp:  [97.3 °F (36.3 °C)-98.2 °F (36.8 °C)] 97.3 °F (36.3 °C)  Pulse:  [53-88] 62  Resp:  [15-20] 18  SpO2:  [99 %-100 %] 100 %  BP: (106-142)/(60-93) 129/77     Weight: 68.9 kg (151 lb 12.8 oz)  Body mass index is 23.27 kg/m².    Intake/Output Summary (Last 24 hours) at 2/26/2022 1039  Last data filed at 2/25/2022 1427  Gross per 24 hour   Intake 590.73 ml   Output --   Net 590.73 ml      Physical Exam  Vitals and nursing note reviewed.   Constitutional:       General: She is not in acute distress.     Appearance: Normal appearance. She is normal weight. She is not ill-appearing.   HENT:      Head: Normocephalic and atraumatic.      Mouth/Throat:      Mouth: Mucous membranes are moist.   Eyes:      Extraocular Movements: Extraocular movements intact.      Pupils: Pupils are equal, round, and reactive to light.    Cardiovascular:      Rate and Rhythm: Normal rate and regular rhythm.      Heart sounds: No murmur heard.    No gallop.   Pulmonary:      Effort: Pulmonary effort is normal. No respiratory distress.      Breath sounds: Normal breath sounds. No wheezing or rales.   Abdominal:      General: Bowel sounds are normal. There is no distension.      Tenderness: There is abdominal tenderness. There is no guarding.   Musculoskeletal:         General: No tenderness.      Cervical back: Normal range of motion and neck supple.      Right lower leg: No edema.      Left lower leg: No edema.   Skin:     General: Skin is warm.      Findings: No erythema, lesion or rash.   Neurological:      Mental Status: She is alert and oriented to person, place, and time. Mental status is at baseline.      Motor: No weakness.      Coordination: Coordination normal.   Psychiatric:         Mood and Affect: Mood normal.       Significant Labs: All pertinent labs within the past 24 hours have been reviewed.  CBC:   Recent Labs   Lab 02/24/22  1420 02/25/22  0456 02/26/22  0418   WBC 8.95 7.47 8.33   HGB 11.9* 10.5* 11.5*   HCT 35.8* 31.9* 34.3*    246 265     CMP:   Recent Labs   Lab 02/24/22  1420 02/25/22  0456 02/26/22  0418    139 141   K 3.9 3.5 3.4*    109 107   CO2 24 23 25   * 83 81   BUN 14 7 7   CREATININE 1.0 0.6 0.6   CALCIUM 9.2 8.1* 8.9   PROT 7.4 5.7* 6.5   ALBUMIN 4.3 3.2* 3.6   BILITOT 0.2 0.4 0.5   ALKPHOS 60 56 66   AST 16 32 22   ALT 19 48* 42   ANIONGAP 9 7* 9   EGFRNONAA >60 >60 >60       Significant Imaging: I have reviewed all pertinent imaging results/findings within the past 24 hours.  CT: I have reviewed all pertinent results/findings within the past 24 hours and my personal findings are:     Echo: I have reviewed all pertinent results/findings within the past 24 hours and my personal findings are:     U/S: I have reviewed all pertinent results/findings within the past 24 hours and my personal  findings are:

## 2022-02-27 LAB
ALBUMIN SERPL BCP-MCNC: 3.7 G/DL (ref 3.5–5.2)
ALP SERPL-CCNC: 62 U/L (ref 55–135)
ALT SERPL W/O P-5'-P-CCNC: 33 U/L (ref 10–44)
ANION GAP SERPL CALC-SCNC: 12 MMOL/L (ref 8–16)
AST SERPL-CCNC: 22 U/L (ref 10–40)
BASOPHILS # BLD AUTO: 0.03 K/UL (ref 0–0.2)
BASOPHILS NFR BLD: 0.4 % (ref 0–1.9)
BILIRUB SERPL-MCNC: 0.4 MG/DL (ref 0.1–1)
BUN SERPL-MCNC: 5 MG/DL (ref 6–20)
CALCIUM SERPL-MCNC: 9.1 MG/DL (ref 8.7–10.5)
CHLORIDE SERPL-SCNC: 107 MMOL/L (ref 95–110)
CO2 SERPL-SCNC: 22 MMOL/L (ref 23–29)
CREAT SERPL-MCNC: 0.7 MG/DL (ref 0.5–1.4)
DIFFERENTIAL METHOD: ABNORMAL
EOSINOPHIL # BLD AUTO: 0 K/UL (ref 0–0.5)
EOSINOPHIL NFR BLD: 0.5 % (ref 0–8)
ERYTHROCYTE [DISTWIDTH] IN BLOOD BY AUTOMATED COUNT: 12.4 % (ref 11.5–14.5)
EST. GFR  (AFRICAN AMERICAN): >60 ML/MIN/1.73 M^2
EST. GFR  (NON AFRICAN AMERICAN): >60 ML/MIN/1.73 M^2
GLUCOSE SERPL-MCNC: 82 MG/DL (ref 70–110)
HCT VFR BLD AUTO: 33.7 % (ref 37–48.5)
HGB BLD-MCNC: 11.4 G/DL (ref 12–16)
IMM GRANULOCYTES # BLD AUTO: 0.05 K/UL (ref 0–0.04)
IMM GRANULOCYTES NFR BLD AUTO: 0.6 % (ref 0–0.5)
LYMPHOCYTES # BLD AUTO: 2 K/UL (ref 1–4.8)
LYMPHOCYTES NFR BLD: 23.4 % (ref 18–48)
MCH RBC QN AUTO: 33.3 PG (ref 27–31)
MCHC RBC AUTO-ENTMCNC: 33.8 G/DL (ref 32–36)
MCV RBC AUTO: 99 FL (ref 82–98)
MONOCYTES # BLD AUTO: 0.6 K/UL (ref 0.3–1)
MONOCYTES NFR BLD: 7.6 % (ref 4–15)
NEUTROPHILS # BLD AUTO: 5.6 K/UL (ref 1.8–7.7)
NEUTROPHILS NFR BLD: 67.5 % (ref 38–73)
NRBC BLD-RTO: 0 /100 WBC
PLATELET # BLD AUTO: 254 K/UL (ref 150–450)
PMV BLD AUTO: 10 FL (ref 9.2–12.9)
POCT GLUCOSE: 133 MG/DL (ref 70–110)
POCT GLUCOSE: 166 MG/DL (ref 70–110)
POCT GLUCOSE: 66 MG/DL (ref 70–110)
POCT GLUCOSE: 90 MG/DL (ref 70–110)
POTASSIUM SERPL-SCNC: 3.8 MMOL/L (ref 3.5–5.1)
PROT SERPL-MCNC: 6.8 G/DL (ref 6–8.4)
RBC # BLD AUTO: 3.42 M/UL (ref 4–5.4)
SODIUM SERPL-SCNC: 141 MMOL/L (ref 136–145)
WBC # BLD AUTO: 8.34 K/UL (ref 3.9–12.7)

## 2022-02-27 PROCEDURE — 25000003 PHARM REV CODE 250

## 2022-02-27 PROCEDURE — G0378 HOSPITAL OBSERVATION PER HR: HCPCS

## 2022-02-27 PROCEDURE — 96365 THER/PROPH/DIAG IV INF INIT: CPT

## 2022-02-27 PROCEDURE — 63600175 PHARM REV CODE 636 W HCPCS: Performed by: NURSE PRACTITIONER

## 2022-02-27 PROCEDURE — 36415 COLL VENOUS BLD VENIPUNCTURE: CPT | Performed by: INTERNAL MEDICINE

## 2022-02-27 PROCEDURE — 85025 COMPLETE CBC W/AUTO DIFF WBC: CPT | Performed by: INTERNAL MEDICINE

## 2022-02-27 PROCEDURE — 25000003 PHARM REV CODE 250: Performed by: INTERNAL MEDICINE

## 2022-02-27 PROCEDURE — 63600175 PHARM REV CODE 636 W HCPCS

## 2022-02-27 PROCEDURE — 96361 HYDRATE IV INFUSION ADD-ON: CPT

## 2022-02-27 PROCEDURE — 96376 TX/PRO/DX INJ SAME DRUG ADON: CPT

## 2022-02-27 PROCEDURE — 80053 COMPREHEN METABOLIC PANEL: CPT | Performed by: INTERNAL MEDICINE

## 2022-02-27 PROCEDURE — 63600175 PHARM REV CODE 636 W HCPCS: Performed by: INTERNAL MEDICINE

## 2022-02-27 PROCEDURE — 96375 TX/PRO/DX INJ NEW DRUG ADDON: CPT

## 2022-02-27 RX ORDER — SUMATRIPTAN 6 MG/.5ML
6 INJECTION, SOLUTION SUBCUTANEOUS ONCE
Status: DISCONTINUED | OUTPATIENT
Start: 2022-02-27 | End: 2022-02-27

## 2022-02-27 RX ORDER — KETOROLAC TROMETHAMINE 30 MG/ML
15 INJECTION, SOLUTION INTRAMUSCULAR; INTRAVENOUS ONCE
Status: COMPLETED | OUTPATIENT
Start: 2022-02-27 | End: 2022-02-27

## 2022-02-27 RX ORDER — SODIUM CHLORIDE 9 MG/ML
INJECTION, SOLUTION INTRAVENOUS CONTINUOUS
Status: DISCONTINUED | OUTPATIENT
Start: 2022-02-27 | End: 2022-02-28

## 2022-02-27 RX ORDER — IBUPROFEN 400 MG/1
400 TABLET ORAL EVERY 6 HOURS PRN
Status: DISCONTINUED | OUTPATIENT
Start: 2022-02-27 | End: 2022-02-28 | Stop reason: HOSPADM

## 2022-02-27 RX ORDER — PROCHLORPERAZINE EDISYLATE 5 MG/ML
10 INJECTION INTRAMUSCULAR; INTRAVENOUS ONCE
Status: COMPLETED | OUTPATIENT
Start: 2022-02-27 | End: 2022-02-27

## 2022-02-27 RX ORDER — MAGNESIUM SULFATE 1 G/100ML
1 INJECTION INTRAVENOUS ONCE
Status: COMPLETED | OUTPATIENT
Start: 2022-02-27 | End: 2022-02-27

## 2022-02-27 RX ORDER — DEXAMETHASONE SODIUM PHOSPHATE 4 MG/ML
6 INJECTION, SOLUTION INTRA-ARTICULAR; INTRALESIONAL; INTRAMUSCULAR; INTRAVENOUS; SOFT TISSUE ONCE
Status: COMPLETED | OUTPATIENT
Start: 2022-02-27 | End: 2022-02-27

## 2022-02-27 RX ADMIN — IBUPROFEN 400 MG: 400 TABLET, FILM COATED ORAL at 03:02

## 2022-02-27 RX ADMIN — PROCHLORPERAZINE EDISYLATE 10 MG: 5 INJECTION INTRAMUSCULAR; INTRAVENOUS at 06:02

## 2022-02-27 RX ADMIN — SODIUM CHLORIDE 1000 ML: 0.9 INJECTION, SOLUTION INTRAVENOUS at 06:02

## 2022-02-27 RX ADMIN — KETOROLAC TROMETHAMINE 15 MG: 30 INJECTION, SOLUTION INTRAMUSCULAR at 06:02

## 2022-02-27 RX ADMIN — SODIUM CHLORIDE: 0.9 INJECTION, SOLUTION INTRAVENOUS at 06:02

## 2022-02-27 RX ADMIN — KETOROLAC TROMETHAMINE 15 MG: 30 INJECTION, SOLUTION INTRAMUSCULAR at 02:02

## 2022-02-27 RX ADMIN — PANTOPRAZOLE SODIUM 40 MG: 40 TABLET, DELAYED RELEASE ORAL at 09:02

## 2022-02-27 RX ADMIN — ALUMINUM HYDROXIDE, MAGNESIUM HYDROXIDE, AND SIMETHICONE 30 ML: 200; 200; 20 SUSPENSION ORAL at 03:02

## 2022-02-27 RX ADMIN — LORAZEPAM 1 MG: 2 INJECTION INTRAMUSCULAR; INTRAVENOUS at 09:02

## 2022-02-27 RX ADMIN — DEXAMETHASONE SODIUM PHOSPHATE 6 MG: 4 INJECTION, SOLUTION INTRA-ARTICULAR; INTRALESIONAL; INTRAMUSCULAR; INTRAVENOUS; SOFT TISSUE at 06:02

## 2022-02-27 RX ADMIN — MAGNESIUM SULFATE 1 G: 1 INJECTION INTRAVENOUS at 08:02

## 2022-02-27 RX ADMIN — PROCHLORPERAZINE EDISYLATE 5 MG: 5 INJECTION INTRAMUSCULAR; INTRAVENOUS at 03:02

## 2022-02-27 RX ADMIN — IBUPROFEN 400 MG: 400 TABLET, FILM COATED ORAL at 09:02

## 2022-02-27 NOTE — ASSESSMENT & PLAN NOTE
"-patient presents with mid-epigastric abdominal pain, radiating to lower abdomen and right upper quadrant with nausea and vomiting  -Received 2L bolus in ED   -Hepatomegaly with nonspecific periportal edema and heterogeneous parenchymal attenuation/slight "nutmeg" liver configuration; concerning for acute hepatitis vs veno-occlusive vs sequela of Budd-Chiari syndrome vs IVC thrombosis  -US liver revealed pericholecystic fluid, likely reactive and no cholelithiasis or sonographic evidence of acute cholecystitis.   -Liver function continue to be unremarkable; T.bili 0.2  -TTE was normal     Plan:  -change to clear diet due to pain after eating solids this morning  -will attempt soft diet today   -need to f[place]u [place] [facility] [place] per GI reccs  -MIVF x1 day  -anti-emetics for N/V  -pain management ---> morphine and tylenol prn, controlling her abd pain        "

## 2022-02-27 NOTE — CONSULTS
[facility]  Vascular Neurology  Comprehensive Stroke Center  TeleVascular Neurology Acute Consultation Note      Consults    Consulting Provider: GARRISON COUCH  Current Providers  No providers found    Patient Location:  [facility] TELEMETRY UNIT IP Unit  Spoke hospital nurse at bedside with patient assisting consultant.     Patient information was obtained from patient and primary team.         Assessment/Plan:       Diagnoses:   Migraine with aura and without status migrainosus, not intractable  Migraine with aura and without status migrainosus, not intractable    Treat migraine with prophylactic regimen give HA frequency.        STROKE DOCUMENTATION     Acute Stroke Times:   Acute Stroke Times   Last Known Normal Date: 02/26/22  Last Known Normal Time: 2235  Symptom Onset Date: 02/26/22  Symptom Onset Time: 2235  Stroke Team Called Date: 02/26/22  Stroke Team Called Time: 2255  Stroke Team Arrival Date: 02/26/22  Stroke Team Arrival Time: 2303  CT Interpretation Time: 2330  Alteplase Recommended: No  Thrombectomy Recommended: No    NIH Scale:  Interval: baseline  1a. Level of Consciousness: 0-->Alert, keenly responsive  1b. LOC Questions: 0-->Answers both questions correctly  1c. LOC Commands: 0-->Performs both tasks correctly  2. Best Gaze: 0-->Normal  3. Visual: 0-->No visual loss  4. Facial Palsy: 0-->Normal symmetrical movements  5a. Motor Arm, Left: 0-->No drift, limb holds 90 (or 45) degrees for full 10 secs  5b. Motor Arm, Right: 0-->No drift, limb holds 90 (or 45) degrees for full 10 secs  6a. Motor Leg, Left: 0-->No drift, leg holds 30 degree position for full 5 secs  6b. Motor Leg, Right: 0-->No drift, leg holds 30 degree position for full 5 secs  7. Limb Ataxia: 0-->Absent  8. Sensory: 0-->Normal, no sensory loss  9. Best Language: 0-->No aphasia, normal  10. Dysarthria: 0-->Normal  11. Extinction and Inattention (formerly Neglect): 0-->No abnormality  Total (NIH  "Stroke Scale): 0     Modified Villalba Score: 0  Nazia Coma Scale:    ABCD2 Score:    [id number] Score:   HAS -BLED Score:   ICH Score:   Hunt & Hawkins Classification:       Blood pressure (!) 155/98, pulse 86, temperature 98.4 °F (36.9 °C), temperature source Oral, resp. rate 16, height 5' 8" (1.727 m), weight 68.5 kg (151 lb), SpO2 100 %, not currently breastfeeding.  Alteplase Eligible?: No  Alteplase Recommendation: Alteplase not recommended due to Suspected stroke mimic  and Patient back to neurological baseline  Possible Interventional Revascularization Candidate? No; No ischemic penumbra, No; no significant neurologic deficit (NIHSS <6) , No; at this time symptoms not suggestive of large vessel occlusion and No; significant pre-stroke disability     [place]position Recommendation[place] [facility]     Subjective:     History of Present Illness:  This is a 27-year-old female who was admitted 02/24/2022 with abdominal pain, nausea and vomiting.  Patient was last known normal at 22 35 tonight at which time she began complaining of bilateral tongue numbness, bilateral jaw tightness and blurred vision.  The episode lasted 40 minutes.  She subsequently developed a headache.  She had a similar episode 1 year ago for which she was seen in the emergency room and evaluated.  The patient states her headaches occurred Q 2-3 days.  They are retro-orbital and temporal in location and throbbing in nature.  They are associated with nausea vomiting, photophobia and sonophobia.        Woke up with symptoms?: no    Recent bleeding noted: no  Does the patient take any Blood Thinners? no  Medications: No relevant medications      Past Medical History: Migraine    Past Surgical History: no relevant surgical history    Family History: no relevant history    Social History: no smoking, no drinking, no drugs    Allergies: No Known Allergies     Review of Systems   Constitutional: Negative for chills and fever.   HENT: " "Negative for congestion and sore throat.    Eyes: Positive for visual disturbance.   Respiratory: Negative for shortness of breath.    Cardiovascular: Negative for chest pain and palpitations.   Gastrointestinal: Negative for blood in stool, diarrhea, nausea and vomiting.   Genitourinary: Negative for difficulty urinating and hematuria.   Musculoskeletal: Negative for back pain and neck pain.   Neurological: Positive for headaches. Negative for dizziness and speech difficulty.     Objective:   Vitals: Blood pressure (!) 155/98, pulse 86, temperature 98.4 °F (36.9 °C), temperature source Oral, resp. rate 16, height 5' 8" (1.727 m), weight 68.5 kg (151 lb), SpO2 100 %, not currently breastfeeding.     CT READ: Yes  No hemmorhage. No mass effect. No early infarct signs.     Physical Exam  Vitals reviewed.   Constitutional:       Appearance: Normal appearance. She is well-developed.   HENT:      Head: Normocephalic and atraumatic.      Nose: Nose normal.   Eyes:      Pupils: Pupils are equal, round, and reactive to light.   Cardiovascular:      Rate and Rhythm: Normal rate and regular rhythm.   Pulmonary:      Effort: Pulmonary effort is normal.   Neurological:      General: No focal deficit present.      Mental Status: She is alert and oriented to person, place, and time. Mental status is at baseline.      Cranial Nerves: No cranial nerve deficit.      Sensory: No sensory deficit.      Motor: No weakness.      Coordination: Coordination normal.   Psychiatric:         Mood and Affect: Mood normal.               Recommended the emergency room physician to have a brief discussion with the patient and/or family if available regarding the  risks and benefits of treatment, and to briefly document the occurrence of that discussion in his clinical encounter note.     The attending portion of this evaluation, treatment, and documentation was performed per Beth Schulz MD via audiovisual.    Billing code:  " (non-intervention mild to moderate stroke, TIA, some mimics)    · This patient has a critical neurological condition/illness, with some potential for high morbidity and mortality.  · There is a moderate probability for acute neurological change leading to clinical and possibly life-threatening deterioration requiring highest level of physician preparedness for urgent intervention.  · Care was coordinated with other physicians involved in the patient's care.  · Radiologic studies and laboratory data were reviewed and interpreted, and plan of care was re-assessed based on the results.  · Diagnosis, treatment options and prognosis may have been discussed with the patient and/or family members or caregiver.      In your opinion, this was a: Tier 1 N/A    Consult End Time: 11:56 PM     Beth Schulz MD  [facility]  Vascular Neurology   [facility]

## 2022-02-27 NOTE — PROGRESS NOTES
[facility] Medicine  Progress Note    Patient Name: Gladys Kenyon  MRN: [id number]  Patient Class: OP- Observation   Admission Date: 2/24/2022  Length of Stay: 0 days  Attending Physician: Ben Batista MD  Primary Care Provider: Primary Doctor No        Subjective:     Principal Problem:Generalized abdominal pain        HPI:  Gladys Kenyon is a 28 y/o female with no known medical history presents to [facility] ED with complaints of abdominal pain. Patient reports she has been having intermittent and intense abdominal pain since yesterday but today her abdominal pain has progressively worsened. Patient reports abdominal pain is generalized and radiates to lower abdomen and right upper quadrant. Patient reports abdominal pain feels like cramps, is constant, and rates pain 8/10 on numeric pain scale. Patient reports she also had sudden nausea and vomiting around 4pm today. Patient denies bloody stools, diarrhea, or reynold-colored stools but state she had a burning sensation with her bowel movement today. Patient tried to take Pepto-Bismol but threw it up. Patient reports she is on her menstrual cycle and usually have mild abdominal cramping but the abdominal pain she is experiencing is worse than menstrual cramps. Patient denies fever, chills, SOB, chest pain, cough, palpitations, leg swelling, or urinary symptoms. Patient denies family history of liver disease. Patient denies alcohol or drug use. Patient does not take any medications. 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 GI consulted. Patient will be placed into hospital medicine observation services under my care in collaboration with Dr. Radha Garrido.      Corin #[id number] was utilized during my interview.       Overview/Hospital Course:  Ghanaian only speaking patient    2/25- today her pain is 5/10 which is improved from 10/10.  No vomiting today.  GI following, will f/u with their recommendations. Will monitor condition and  monitor labs.     2/26- had Tajik speaking nurse at bedside today to assist, she is having pain after she eats solid, was fine last night but this morning had strawberries and started having abd pain again, RUQ and epigastric.  10/10 pain without morphine 2mg and 1/10 pain after morphine.  Frequency is good lasting 4-5 hrs.  She doesn't ask for much pain meds per nurse.  Last dose was overnight.  LFTs continue to be wnl.  HIV pending.  I will change her diet to clears.  Continue to monitor for improvement, get better pain control, and f/u with GI reccs and plans.      2/27- pt last night had a stroke code called for blurred vision, facial numbness, jaw pain, ct head was normal, was given toradol and ivf.  She is still having headaches.  Does have h/o migraines that she said she takes ibuprofen for.  Will order that today.  I witness her eating clear liquids and has not had pain from it and will try soft food today.  If pain is better control and if can tolerate soft diet we can start planning dc with f/u with [facility] hepatology Monday.        Interval History: see hosp course    Review of Systems   Constitutional:  Negative for chills, fatigue and fever.   HENT:  Negative for congestion, sore throat and trouble swallowing.    Respiratory:  Negative for cough, chest tightness, shortness of breath and wheezing.    Cardiovascular:  Negative for chest pain, palpitations and leg swelling.   Gastrointestinal:  Negative for abdominal pain, diarrhea, nausea and vomiting.   Endocrine: Negative for cold intolerance and heat intolerance.   Genitourinary:  Negative for difficulty urinating, dysuria and frequency.   Musculoskeletal:  Negative for back pain and gait problem.   Skin:  Negative for color change, rash and wound.   Neurological:  Negative for seizures, syncope and weakness.   Hematological:  Does not bruise/bleed easily.   Psychiatric/Behavioral:  Negative for agitation and confusion. The patient is not  nervous/anxious.    Objective:     Vital Signs (Most Recent):  Temp: 98 °F (36.7 °C) (02/27/22 0820)  Pulse: 73 (02/27/22 0820)  Resp: 20 (02/27/22 0820)  BP: (!) 141/76 (02/27/22 0820)  SpO2: 99 % (02/27/22 0820)   Vital Signs (24h Range):  Temp:  [97.3 °F (36.3 °C)-98.6 °F (37 °C)] 98 °F (36.7 °C)  Pulse:  [60-88] 73  Resp:  [16-20] 20  SpO2:  [98 %-100 %] 99 %  BP: (122-155)/(66-98) 141/76     Weight: 68.5 kg (151 lb)  Body mass index is 22.96 kg/m².    Intake/Output Summary (Last 24 hours) at 2/27/2022 0913  Last data filed at 2/26/2022 1120  Gross per 24 hour   Intake 300 ml   Output --   Net 300 ml      Physical Exam  Vitals and nursing note reviewed.   Constitutional:       General: She is not in acute distress.     Appearance: Normal appearance. She is normal weight. She is not ill-appearing.   HENT:      Head: Normocephalic and atraumatic.      Mouth/Throat:      Mouth: Mucous membranes are moist.   Eyes:      Extraocular Movements: Extraocular movements intact.      Pupils: Pupils are equal, round, and reactive to light.   Cardiovascular:      Rate and Rhythm: Normal rate and regular rhythm.      Heart sounds: No murmur heard.    No gallop.   Pulmonary:      Effort: Pulmonary effort is normal. No respiratory distress.      Breath sounds: Normal breath sounds. No wheezing or rales.   Abdominal:      General: Bowel sounds are normal. There is no distension.      Tenderness: There is no abdominal tenderness. There is no guarding.   Musculoskeletal:         General: No tenderness.      Cervical back: Normal range of motion and neck supple.      Right lower leg: No edema.      Left lower leg: No edema.   Skin:     General: Skin is warm.      Findings: No erythema, lesion or rash.   Neurological:      Mental Status: She is alert and oriented to person, place, and time. Mental status is at baseline.      Motor: No weakness.      Coordination: Coordination normal.   Psychiatric:         Mood and Affect: Mood  "normal.       Significant Labs: All pertinent labs within the past 24 hours have been reviewed.  Bilirubin:   Recent Labs   Lab 02/24/22  1420 02/25/22  0456 02/26/22  0418 02/27/22  0406   BILITOT 0.2 0.4 0.5 0.4     Blood Culture: No results for input(s): LABBLOO in the last 48 hours.  BMP:   Recent Labs   Lab 02/27/22  0406   GLU 82      K 3.8      CO2 22*   BUN 5*   CREATININE 0.7   CALCIUM 9.1     CBC:   Recent Labs   Lab 02/26/22  0418 02/27/22  0406   WBC 8.33 8.34   HGB 11.5* 11.4*   HCT 34.3* 33.7*    254     CMP:   Recent Labs   Lab 02/26/22  0418 02/27/22  0406    141   K 3.4* 3.8    107   CO2 25 22*   GLU 81 82   BUN 7 5*   CREATININE 0.6 0.7   CALCIUM 8.9 9.1   PROT 6.5 6.8   ALBUMIN 3.6 3.7   BILITOT 0.5 0.4   ALKPHOS 66 62   AST 22 22   ALT 42 33   ANIONGAP 9 12   EGFRNONAA >60 >60     Lipase: No results for input(s): LIPASE in the last 48 hours.    Significant Imaging: I have reviewed all pertinent imaging results/findings within the past 24 hours.      Assessment/Plan:      * Generalized abdominal pain  -patient presents with mid-epigastric abdominal pain, radiating to lower abdomen and right upper quadrant with nausea and vomiting  -Received 2L bolus in ED   -Hepatomegaly with nonspecific periportal edema and heterogeneous parenchymal attenuation/slight "nutmeg" liver configuration; concerning for acute hepatitis vs veno-occlusive vs sequela of Budd-Chiari syndrome vs IVC thrombosis  -US liver revealed pericholecystic fluid, likely reactive and no cholelithiasis or sonographic evidence of acute cholecystitis.   -Liver function continue to be unremarkable; T.bili 0.2    Plan:  -change to clear diet due to pain after eating solids this morning  -will attempt soft diet today   -need to f/u with Hepatology at [facility] after [place] per GI reccs  -MIVF x1 day  -anti-emetics for N/V  -pain management ---> morphine and tylenol prn, controlling her abd pain          Migraine with aura " and without status migrainosus, not intractable  toradol x 1 didn't help, will order prn ibuprofen 400 prn as she said that helps her at home, will try and monitor, may give triptan if doesn't help      Anemia  -H/H is 11.9/35.8, which is stable and consistent with previous laboratory measurements. Patient exhibits no signs or symptoms of acute bleeding  -Etiology unknown; no baseline to compare to  -RBC 3.59, , MCH 33.1   -Pending retic, B12, folate, and TSH  -No indication for blood transfusion  -Continue to monitor serial CBC  -Transfuse for Hgb <7 or if symptomatic        Nausea and vomiting  Anti emetics prn, resolved        VTE Risk Mitigation (From admission, onward)         Ordered     IP VTE LOW RISK PATIENT  Once         02/24/22 1747     Place sequential compression device  Until discontinued         02/24/22 1747                Discharge Planning   KENDRICK:      Code Status: Full Code   Is the patient medically ready for discharge?:     Reason for patient still in hospital (select all that apply): Patient trending condition                     Ben Batista MD  Department of Hospital Medicine   [facility]

## 2022-02-27 NOTE — PLAN OF CARE
"Plan of care reviewed with the patient. Fall and safety precautions taken. On Telemetry monitoring with NSR, no true "red alarms" noted in the shift. Patient complained of headache, blurred vision and thick tongue with numbness @ 2210 hrs,  Vitals recorded as BP: 141/87, HR:76 and SpO2: 100%, Acu Check 110 mg/dl. Informed NP, MsGogoFrancisca and code stroke was announced. Patient's CT head was Negative, Neurologist was consulted through video conference. Patient was given Ketorolac 15 mg IV for head ache 6/10. Also received Lactated Ringer 1000 ml Bolus. Advised the patient to call for assistance. Continued monitoring the patient.  "

## 2022-02-27 NOTE — SUBJECTIVE & OBJECTIVE
"  Woke up with symptoms?: no    Recent bleeding noted: no  Does the patient take any Blood Thinners? no  Medications: No relevant medications      Past Medical History: Migraine    Past Surgical History: no relevant surgical history    Family History: no relevant history    Social History: no smoking, no drinking, no drugs    Allergies: No Known Allergies     Review of Systems   Constitutional: Negative for chills and fever.   HENT: Negative for congestion and sore throat.    Eyes: Positive for visual disturbance.   Respiratory: Negative for shortness of breath.    Cardiovascular: Negative for chest pain and palpitations.   Gastrointestinal: Negative for blood in stool, diarrhea, nausea and vomiting.   Genitourinary: Negative for difficulty urinating and hematuria.   Musculoskeletal: Negative for back pain and neck pain.   Neurological: Positive for headaches. Negative for dizziness and speech difficulty.     Objective:   Vitals: Blood pressure (!) 155/98, pulse 86, temperature 98.4 °F (36.9 °C), temperature source Oral, resp. rate 16, height 5' 8" (1.727 m), weight 68.5 kg (151 lb), SpO2 100 %, not currently breastfeeding.     CT READ: Yes  No hemmorhage. No mass effect. No early infarct signs.     Physical Exam  Vitals reviewed.   Constitutional:       Appearance: Normal appearance. She is well-developed.   HENT:      Head: Normocephalic and atraumatic.      Nose: Nose normal.   Eyes:      Pupils: Pupils are equal, round, and reactive to light.   Cardiovascular:      Rate and Rhythm: Normal rate and regular rhythm.   Pulmonary:      Effort: Pulmonary effort is normal.   Neurological:      General: No focal deficit present.      Mental Status: She is alert and oriented to person, place, and time. Mental status is at baseline.      Cranial Nerves: No cranial nerve deficit.      Sensory: No sensory deficit.      Motor: No weakness.      Coordination: Coordination normal.   Psychiatric:         Mood and Affect: Mood " normal.

## 2022-02-27 NOTE — SIGNIFICANT EVENT
Code stroke heard overhead. VN cued in to pt's room to document. Pt reports tongue swelling and that her jaw is locked down. Pt also c/o blurred vision bilaterally. RT Vane, Juany Sinclair RN, LEO Yun, and Jorden, NP present at bedside   bp 155/98, pulse 86, , o2 sats 100% on RA, XDI=549  1 liter LR bolus ordered to be given over 2 hours.  Pt brought down to CT for stat CT head via bed at 2301

## 2022-02-27 NOTE — HPI
This is a 27-year-old female who was admitted 02/24/2022 with abdominal pain, nausea and vomiting.  Patient was last known normal at 22 35 tonight at which time she began complaining of bilateral tongue numbness, bilateral jaw tightness and blurred vision.  The episode lasted 40 minutes.  She subsequently developed a headache.  She had a similar episode 1 year ago for which she was seen in the emergency room and evaluated.  The patient states her headaches occurred Q 2-3 days.  They are retro-orbital and temporal in location and throbbing in nature.  They are associated with nausea vomiting, photophobia and sonophobia.

## 2022-02-27 NOTE — ASSESSMENT & PLAN NOTE
toradol x 1 didn't help, will order prn ibuprofen 400 prn as she said that helps her at home, will try and monitor, may give triptan if doesn't help

## 2022-02-27 NOTE — PROGRESS NOTES
VN NOTE   Bedside nurse called a Code Stroke. Dr Batista at bedside. He confirmed that a code stroke was also called last night. Patient's symptoms was numbing of her tongue and one side of her face, which MD states are the exact symptoms she had last night. He says this happens when she has a migraine and to give her the pain meds ordered. Code stroke ended at 5:42 PM. Patient is stable and remains in room.   02/27/22 3996   Significant Events This Shift   Significant Events Other (comment)  (Code Stroke)   Safety/Activity   Patient Rounds bed in low position;visualized patient   Safety Promotion/Fall Prevention side rails raised x 2   Positioning   Body Position supine   Head of Bed (HOB) Positioning HOB at 20-30 degrees

## 2022-02-27 NOTE — SUBJECTIVE & OBJECTIVE
Interval History: see hosp course    Review of Systems   Constitutional:  Negative for chills, fatigue and fever.   HENT:  Negative for congestion, sore throat and trouble swallowing.    Respiratory:  Negative for cough, chest tightness, shortness of breath and wheezing.    Cardiovascular:  Negative for chest pain, palpitations and leg swelling.   Gastrointestinal:  Negative for abdominal pain, diarrhea, nausea and vomiting.   Endocrine: Negative for cold intolerance and heat intolerance.   Genitourinary:  Negative for difficulty urinating, dysuria and frequency.   Musculoskeletal:  Negative for back pain and gait problem.   Skin:  Negative for color change, rash and wound.   Neurological:  Negative for seizures, syncope and weakness.   Hematological:  Does not bruise/bleed easily.   Psychiatric/Behavioral:  Negative for agitation and confusion. The patient is not nervous/anxious.    Objective:     Vital Signs (Most Recent):  Temp: 98 °F (36.7 °C) (02/27/22 0820)  Pulse: 73 (02/27/22 0820)  Resp: 20 (02/27/22 0820)  BP: (!) 141/76 (02/27/22 0820)  SpO2: 99 % (02/27/22 0820)   Vital Signs (24h Range):  Temp:  [97.3 °F (36.3 °C)-98.6 °F (37 °C)] 98 °F (36.7 °C)  Pulse:  [60-88] 73  Resp:  [16-20] 20  SpO2:  [98 %-100 %] 99 %  BP: (122-155)/(66-98) 141/76     Weight: 68.5 kg (151 lb)  Body mass index is 22.96 kg/m².    Intake/Output Summary (Last 24 hours) at 2/27/2022 0913  Last data filed at 2/26/2022 1120  Gross per 24 hour   Intake 300 ml   Output --   Net 300 ml      Physical Exam  Vitals and nursing note reviewed.   Constitutional:       General: She is not in acute distress.     Appearance: Normal appearance. She is normal weight. She is not ill-appearing.   HENT:      Head: Normocephalic and atraumatic.      Mouth/Throat:      Mouth: Mucous membranes are moist.   Eyes:      Extraocular Movements: Extraocular movements intact.      Pupils: Pupils are equal, round, and reactive to light.   Cardiovascular:       Rate and Rhythm: Normal rate and regular rhythm.      Heart sounds: No murmur heard.    No gallop.   Pulmonary:      Effort: Pulmonary effort is normal. No respiratory distress.      Breath sounds: Normal breath sounds. No wheezing or rales.   Abdominal:      General: Bowel sounds are normal. There is no distension.      Tenderness: There is no abdominal tenderness. There is no guarding.   Musculoskeletal:         General: No tenderness.      Cervical back: Normal range of motion and neck supple.      Right lower leg: No edema.      Left lower leg: No edema.   Skin:     General: Skin is warm.      Findings: No erythema, lesion or rash.   Neurological:      Mental Status: She is alert and oriented to person, place, and time. Mental status is at baseline.      Motor: No weakness.      Coordination: Coordination normal.   Psychiatric:         Mood and Affect: Mood normal.       Significant Labs: All pertinent labs within the past 24 hours have been reviewed.  Bilirubin:   Recent Labs   Lab 02/24/22  1420 02/25/22  0456 02/26/22  0418 02/27/22  0406   BILITOT 0.2 0.4 0.5 0.4     Blood Culture: No results for input(s): LABBLOO in the last 48 hours.  BMP:   Recent Labs   Lab 02/27/22  0406   GLU 82      K 3.8      CO2 22*   BUN 5*   CREATININE 0.7   CALCIUM 9.1     CBC:   Recent Labs   Lab 02/26/22  0418 02/27/22  0406   WBC 8.33 8.34   HGB 11.5* 11.4*   HCT 34.3* 33.7*    254     CMP:   Recent Labs   Lab 02/26/22  0418 02/27/22  0406    141   K 3.4* 3.8    107   CO2 25 22*   GLU 81 82   BUN 7 5*   CREATININE 0.6 0.7   CALCIUM 8.9 9.1   PROT 6.5 6.8   ALBUMIN 3.6 3.7   BILITOT 0.5 0.4   ALKPHOS 66 62   AST 22 22   ALT 42 33   ANIONGAP 9 12   EGFRNONAA >60 >60     Lipase: No results for input(s): LIPASE in the last 48 hours.    Significant Imaging: I have reviewed all pertinent imaging results/findings within the past 24 hours.

## 2022-02-27 NOTE — SIGNIFICANT EVENT
CODE STROKE DOCUMENTATION  [facility]   PROVIDER LEAD    Code Stroke called: 2249  Time arrived to pt's room: 2251    Reason for Code Stroke as reported by bedside nurse: patient reports blurred vision and tongue numbness as well as jaw pain  Time last seen normal (less than or equal to 3 hours = inclusion criteria for tPA): 1hr    Past Medical History:   Diagnosis Date    No known problems        Condition of patient on my arrival: eyes closed, following commands, no focal deficits, pupils equal and reactive, reports pain to entire face including jaw, reports tongue numbness, blurred vision--started about 30 minutes ago. She reports history of same symptoms when she went to the ED for a headache in the past. Did not eat much today. Menstrual cycle is on currently.     Blood pressure (SBP > 185 or DBP > 110 excludes for tPA):   BP Readings from Last 3 Encounters:   02/26/22 (!) 155/98     Pulse Readings from Last 3 Encounters:   02/26/22 86     Wt Readings from Last 3 Encounters:   02/26/22 68.5 kg (151 lb)       Blood glucose (>400 excludes for tPA): 110    EKG: n/a    CT head: pending    PT > 15 seconds is contraindication (Do not collect if don't expect to use tPA): n/a  INR > 1.7 is contraindication (Do not collect if don't expect to use tPA): n/a  Creatinine: 0.6    AM labs reviewed:  - Platelet count (< 100K excludes for tPA): 265    Time of last anticoagulant (< 24 hours excludes for tPA): n/a    Vascular Neurologist via telemedicine: Dr. Schulz    Impression:   No acute intracranial abnormality    Plan:  Treat for migraine.    Code Stroke ended: 2350    Critical care time spent on the evaluation and treatment of severe organ dysfunction, review of pertinent labs and imaging studies, discussions with consulting providers and discussions with patient/family: 60 minutes.    Marine Boyd NP  [facility]  Pager: [phone]

## 2022-02-27 NOTE — PROGRESS NOTES
[facility]  Progress Note    Patient Name: Gladys Kenyon  MRN: [id number]  Patient Class: OP- Observation   Admission Date: 2/24/2022  Length of Stay: 0 days  Attending Physician: Ben Batista MD  Primary Care Provider: Primary Doctor No        Subjective:     Principal Problem:Generalized abdominal pain        HPI:  Gladys Kenyon is a 28 y/o female with no known medical history [facility] of [facility] pain. Patient reports she has been having intermittent and intense abdominal pain since yesterday but today her abdominal pain has progressively worsened. Patient reports abdominal pain is generalized and radiates to lower abdomen and right upper quadrant. Patient reports abdominal pain feels like cramps, is constant, and rates pain 8/10 on numeric pain scale. Patient reports she also had sudden nausea and vomiting around 4pm today. Patient denies bloody stools, diarrhea, or reynold-colored stools but state she had a burning sensation with her bowel movement today. Patient tried to take Pepto-Bismol but threw it up. Patient reports she is on her menstrual cycle and usually have mild abdominal cramping but the abdominal pain she is experiencing is worse than menstrual cramps. Patient denies fever, chills, SOB, chest pain, cough, palpitations, leg swelling, or urinary symptoms. Patient denies family history of liver disease. Patient denies alcohol or drug use. Patient does not take any medications. 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 GI consulted. Patient will be placed into hospital medicine observation services under my care in collaboration with Dr. Radha Garrido.      Corin #[id number] was utilized during my interview.       Overview/Hospital Course:  Zimbabwean only speaking patient    2/25- today her pain is 5/10 which is improved from 10/10.  No vomiting today.  GI following, will f/u with their recommendations. Will monitor condition and  "monitor labs.     2/26- had Serbian speaking nurse at bedside today to assist, she is having pain after she eats solid, was fine last night but this morning had strawberries and started having abd pain again, RUQ and epigastric.  10/10 pain without morphine 2mg and 1/10 pain after morphine.  Frequency is good lasting 4-5 hrs.  She doesn't ask for much pain meds per nurse.  Last dose was overnight.  LFTs continue to be wnl.  HIV pending.  I will change her diet to clears.  Continue to monitor for improvement, get better pain control, and f/u with GI reccs and plans.      2/27- pt last night had a stroke code called for blurred vision, facial numbness, jaw pain, ct head was normal, was given toradol and ivf.  She is still having headaches.  Does have h/o migraines that she said she takes ibuprofen for.  Will order that today.  I witness her eating clear liquids and has not had pain from it and will try soft food today.  If pain is better control and if can tolerate soft diet we can start planning dc with [place] [facility] [place].        No new subjective & objective note has been filed under this hospital service since the last note was generated.      Assessment/Plan:      * Generalized abdominal pain  -patient presents with mid-epigastric abdominal pain, radiating to lower abdomen and right upper quadrant with nausea and vomiting  -Received 2L bolus in ED   -Hepatomegaly with nonspecific periportal edema and heterogeneous parenchymal attenuation/slight "nutmeg" liver configuration; concerning for acute hepatitis vs veno-occlusive vs sequela of Budd-Chiari syndrome vs IVC thrombosis  -US liver revealed pericholecystic fluid, likely reactive and no cholelithiasis or sonographic evidence of acute cholecystitis.   -Liver function continue to be unremarkable; T.bili 0.2  -TTE was normal     Plan:  -change to clear diet due to pain after eating solids this morning  -will attempt soft diet today   -need to f[place]u with " [place] [facility] [place] per GI reccs  -MIVF x1 day  -anti-emetics for N/V  -pain management ---> morphine and tylenol prn, controlling her abd pain          Migraine with aura and without status migrainosus, not intractable  toradol x 1 didn't help, will order prn ibuprofen 400 prn as she said that helps her at home, will try and monitor, may give triptan if doesn't help      Anemia  -H/H is 11.9/35.8, which is stable and consistent with previous laboratory measurements. Patient exhibits no signs or symptoms of acute bleeding  -Etiology unknown; no baseline to compare to  -RBC 3.59, , MCH 33.1   -Pending retic, B12, folate, and TSH  -No indication for blood transfusion  -Continue to monitor serial CBC  -Transfuse for Hgb <7 or if symptomatic        Nausea and vomiting  Anti emetics prn, resolved        VTE Risk Mitigation (From admission, onward)         Ordered     IP VTE LOW RISK PATIENT  Once         02/24/22 1747     Place sequential compression device  Until discontinued         02/24/22 1747                Discharge Planning   KENDRICK:      Code Status: Full Code   Is the patient medically ready for discharge?:     Reason for patient still in hospital (select all that apply): Patient trending condition                     Ben Batista MD  Department of Hospital Medicine   [facility] - Telemetry

## 2022-02-27 NOTE — ASSESSMENT & PLAN NOTE
"-patient presents with mid-epigastric abdominal pain, radiating to lower abdomen and right upper quadrant with nausea and vomiting  -Received 2L bolus in ED   -Hepatomegaly with nonspecific periportal edema and heterogeneous parenchymal attenuation/slight "nutmeg" liver configuration; concerning for acute hepatitis vs veno-occlusive vs sequela of Budd-Chiari syndrome vs IVC thrombosis  -US liver revealed pericholecystic fluid, likely reactive and no cholelithiasis or sonographic evidence of acute cholecystitis.   -Liver function continue to be unremarkable; T.bili 0.2    Plan:  -change to clear diet due to pain after eating solids this morning  -will attempt soft diet today   -[place] [facility] [place] per GI reccs  -MIVF x1 day  -anti-emetics for N/V  -pain management ---> morphine and tylenol prn, controlling her abd pain        "

## 2022-02-27 NOTE — ASSESSMENT & PLAN NOTE
Migraine with aura and without status migrainosus, not intractable    Treat migraine with prophylactic regimen give HA frequency.

## 2022-02-28 VITALS
HEART RATE: 80 BPM | SYSTOLIC BLOOD PRESSURE: 112 MMHG | RESPIRATION RATE: 18 BRPM | WEIGHT: 151 LBS | OXYGEN SATURATION: 98 % | BODY MASS INDEX: 22.88 KG/M2 | HEIGHT: 68 IN | DIASTOLIC BLOOD PRESSURE: 65 MMHG | TEMPERATURE: 97 F

## 2022-02-28 PROBLEM — R11.2 NAUSEA AND VOMITING: Status: RESOLVED | Noted: 2022-02-24 | Resolved: 2022-02-28

## 2022-02-28 PROBLEM — R10.84 GENERALIZED ABDOMINAL PAIN: Status: RESOLVED | Noted: 2022-02-24 | Resolved: 2022-02-28

## 2022-02-28 PROBLEM — G43.109 MIGRAINE WITH AURA AND WITHOUT STATUS MIGRAINOSUS, NOT INTRACTABLE: Status: RESOLVED | Noted: 2022-02-26 | Resolved: 2022-02-28

## 2022-02-28 LAB
ALBUMIN SERPL BCP-MCNC: 4.1 G/DL (ref 3.5–5.2)
ALP SERPL-CCNC: 70 U/L (ref 55–135)
ALT SERPL W/O P-5'-P-CCNC: 33 U/L (ref 10–44)
ANION GAP SERPL CALC-SCNC: 8 MMOL/L (ref 8–16)
ANION GAP SERPL CALC-SCNC: 9 MMOL/L (ref 8–16)
AST SERPL-CCNC: 18 U/L (ref 10–40)
BASOPHILS # BLD AUTO: 0.02 K/UL (ref 0–0.2)
BASOPHILS NFR BLD: 0.2 % (ref 0–1.9)
BILIRUB SERPL-MCNC: 0.3 MG/DL (ref 0.1–1)
BUN SERPL-MCNC: 7 MG/DL (ref 6–20)
BUN SERPL-MCNC: 8 MG/DL (ref 6–20)
CALCIUM SERPL-MCNC: 9.2 MG/DL (ref 8.7–10.5)
CALCIUM SERPL-MCNC: 9.5 MG/DL (ref 8.7–10.5)
CHLORIDE SERPL-SCNC: 106 MMOL/L (ref 95–110)
CHLORIDE SERPL-SCNC: 107 MMOL/L (ref 95–110)
CO2 SERPL-SCNC: 24 MMOL/L (ref 23–29)
CO2 SERPL-SCNC: 25 MMOL/L (ref 23–29)
CREAT SERPL-MCNC: 0.7 MG/DL (ref 0.5–1.4)
CREAT SERPL-MCNC: 0.7 MG/DL (ref 0.5–1.4)
DIFFERENTIAL METHOD: ABNORMAL
EOSINOPHIL # BLD AUTO: 0 K/UL (ref 0–0.5)
EOSINOPHIL NFR BLD: 0 % (ref 0–8)
ERYTHROCYTE [DISTWIDTH] IN BLOOD BY AUTOMATED COUNT: 12.3 % (ref 11.5–14.5)
EST. GFR  (AFRICAN AMERICAN): >60 ML/MIN/1.73 M^2
EST. GFR  (AFRICAN AMERICAN): >60 ML/MIN/1.73 M^2
EST. GFR  (NON AFRICAN AMERICAN): >60 ML/MIN/1.73 M^2
EST. GFR  (NON AFRICAN AMERICAN): >60 ML/MIN/1.73 M^2
GLUCOSE SERPL-MCNC: 114 MG/DL (ref 70–110)
GLUCOSE SERPL-MCNC: 116 MG/DL (ref 70–110)
HCT VFR BLD AUTO: 36.9 % (ref 37–48.5)
HGB BLD-MCNC: 12.8 G/DL (ref 12–16)
IMM GRANULOCYTES # BLD AUTO: 0.04 K/UL (ref 0–0.04)
IMM GRANULOCYTES NFR BLD AUTO: 0.4 % (ref 0–0.5)
LYMPHOCYTES # BLD AUTO: 1.3 K/UL (ref 1–4.8)
LYMPHOCYTES NFR BLD: 13.9 % (ref 18–48)
MCH RBC QN AUTO: 33.5 PG (ref 27–31)
MCHC RBC AUTO-ENTMCNC: 34.7 G/DL (ref 32–36)
MCV RBC AUTO: 97 FL (ref 82–98)
MONOCYTES # BLD AUTO: 0.3 K/UL (ref 0.3–1)
MONOCYTES NFR BLD: 3.2 % (ref 4–15)
NEUTROPHILS # BLD AUTO: 7.4 K/UL (ref 1.8–7.7)
NEUTROPHILS NFR BLD: 82.3 % (ref 38–73)
NRBC BLD-RTO: 0 /100 WBC
PLATELET # BLD AUTO: 338 K/UL (ref 150–450)
PMV BLD AUTO: 9.4 FL (ref 9.2–12.9)
POTASSIUM SERPL-SCNC: 3.4 MMOL/L (ref 3.5–5.1)
POTASSIUM SERPL-SCNC: 4.6 MMOL/L (ref 3.5–5.1)
PROT SERPL-MCNC: 7.2 G/DL (ref 6–8.4)
RBC # BLD AUTO: 3.82 M/UL (ref 4–5.4)
SODIUM SERPL-SCNC: 138 MMOL/L (ref 136–145)
SODIUM SERPL-SCNC: 141 MMOL/L (ref 136–145)
WBC # BLD AUTO: 9.01 K/UL (ref 3.9–12.7)

## 2022-02-28 PROCEDURE — 25000003 PHARM REV CODE 250: Performed by: INTERNAL MEDICINE

## 2022-02-28 PROCEDURE — 63600175 PHARM REV CODE 636 W HCPCS: Performed by: INTERNAL MEDICINE

## 2022-02-28 PROCEDURE — 36415 COLL VENOUS BLD VENIPUNCTURE: CPT | Performed by: INTERNAL MEDICINE

## 2022-02-28 PROCEDURE — 99213 OFFICE O/P EST LOW 20 MIN: CPT | Mod: ,,, | Performed by: INTERNAL MEDICINE

## 2022-02-28 PROCEDURE — 80053 COMPREHEN METABOLIC PANEL: CPT | Performed by: INTERNAL MEDICINE

## 2022-02-28 PROCEDURE — 96361 HYDRATE IV INFUSION ADD-ON: CPT

## 2022-02-28 PROCEDURE — 99213 PR OFFICE/OUTPT VISIT, EST, LEVL III, 20-29 MIN: ICD-10-PCS | Mod: ,,, | Performed by: INTERNAL MEDICINE

## 2022-02-28 PROCEDURE — 80048 BASIC METABOLIC PNL TOTAL CA: CPT | Performed by: INTERNAL MEDICINE

## 2022-02-28 PROCEDURE — 96366 THER/PROPH/DIAG IV INF ADDON: CPT

## 2022-02-28 PROCEDURE — 85025 COMPLETE CBC W/AUTO DIFF WBC: CPT | Performed by: INTERNAL MEDICINE

## 2022-02-28 PROCEDURE — G0378 HOSPITAL OBSERVATION PER HR: HCPCS

## 2022-02-28 PROCEDURE — 96376 TX/PRO/DX INJ SAME DRUG ADON: CPT

## 2022-02-28 RX ORDER — AMOXICILLIN AND CLAVULANATE POTASSIUM 875; 125 MG/1; MG/1
1 TABLET, FILM COATED ORAL 2 TIMES DAILY
Status: DISCONTINUED | OUTPATIENT
Start: 2022-02-28 | End: 2022-02-28

## 2022-02-28 RX ORDER — HYDROXYZINE HYDROCHLORIDE 10 MG/1
10 TABLET, FILM COATED ORAL 3 TIMES DAILY PRN
Status: DISCONTINUED | OUTPATIENT
Start: 2022-02-28 | End: 2022-02-28 | Stop reason: HOSPADM

## 2022-02-28 RX ORDER — PROCHLORPERAZINE EDISYLATE 5 MG/ML
10 INJECTION INTRAMUSCULAR; INTRAVENOUS ONCE
Status: COMPLETED | OUTPATIENT
Start: 2022-02-28 | End: 2022-02-28

## 2022-02-28 RX ORDER — KETOROLAC TROMETHAMINE 30 MG/ML
15 INJECTION, SOLUTION INTRAMUSCULAR; INTRAVENOUS ONCE
Status: COMPLETED | OUTPATIENT
Start: 2022-02-28 | End: 2022-02-28

## 2022-02-28 RX ORDER — HYDROXYZINE HYDROCHLORIDE 10 MG/1
10 TABLET, FILM COATED ORAL 3 TIMES DAILY PRN
Qty: 60 TABLET | Refills: 1 | Status: SHIPPED | OUTPATIENT
Start: 2022-02-28

## 2022-02-28 RX ORDER — SODIUM CHLORIDE 9 MG/ML
INJECTION, SOLUTION INTRAVENOUS CONTINUOUS
Status: DISCONTINUED | OUTPATIENT
Start: 2022-02-28 | End: 2022-02-28

## 2022-02-28 RX ORDER — MAGNESIUM SULFATE 1 G/100ML
1 INJECTION INTRAVENOUS ONCE
Status: COMPLETED | OUTPATIENT
Start: 2022-02-28 | End: 2022-02-28

## 2022-02-28 RX ORDER — METOCLOPRAMIDE 10 MG/1
10 TABLET ORAL EVERY 8 HOURS PRN
Qty: 60 TABLET | Refills: 1 | Status: SHIPPED | OUTPATIENT
Start: 2022-02-28

## 2022-02-28 RX ORDER — PANTOPRAZOLE SODIUM 40 MG/1
40 TABLET, DELAYED RELEASE ORAL DAILY
Qty: 30 TABLET | Refills: 11 | Status: SHIPPED | OUTPATIENT
Start: 2022-03-01 | End: 2023-03-01

## 2022-02-28 RX ORDER — IBUPROFEN 400 MG/1
400 TABLET ORAL EVERY 6 HOURS PRN
Qty: 60 TABLET | Refills: 2 | Status: SHIPPED | OUTPATIENT
Start: 2022-02-28

## 2022-02-28 RX ADMIN — PANTOPRAZOLE SODIUM 40 MG: 40 TABLET, DELAYED RELEASE ORAL at 09:02

## 2022-02-28 RX ADMIN — SODIUM CHLORIDE: 0.9 INJECTION, SOLUTION INTRAVENOUS at 11:02

## 2022-02-28 RX ADMIN — HYDROXYZINE HYDROCHLORIDE 10 MG: 10 TABLET, FILM COATED ORAL at 04:02

## 2022-02-28 RX ADMIN — AMOXICILLIN AND CLAVULANATE POTASSIUM 1 TABLET: 875; 125 TABLET, FILM COATED ORAL at 11:02

## 2022-02-28 RX ADMIN — KETOROLAC TROMETHAMINE 15 MG: 30 INJECTION, SOLUTION INTRAMUSCULAR at 11:02

## 2022-02-28 RX ADMIN — MAGNESIUM SULFATE 1 G: 1 INJECTION INTRAVENOUS at 11:02

## 2022-02-28 RX ADMIN — PROCHLORPERAZINE EDISYLATE 10 MG: 5 INJECTION INTRAMUSCULAR; INTRAVENOUS at 11:02

## 2022-02-28 RX ADMIN — SODIUM CHLORIDE 1000 ML: 0.9 INJECTION, SOLUTION INTRAVENOUS at 09:02

## 2022-02-28 NOTE — SUBJECTIVE & OBJECTIVE
Subjective:     Interval History: Patient is doing well, states abdominal pain has improved. Denies n/v, tolerating diet well, no changes in bowel movements. Neuro following for status migrainosus.     Review of Systems  General ROS: negative for chills, fever or weight loss  Cardiovascular ROS: no chest pain or dyspnea on exertion  Gastrointestinal ROS: +abdominal pain, nausea, emesis. No change in bowel habits, or black/ bloody stools    Objective:     Vital Signs (Most Recent):  Temp: 96.7 °F (35.9 °C) (02/28/22 0834)  Pulse: 99 (02/28/22 0834)  Resp: 18 (02/28/22 0834)  BP: 116/69 (02/28/22 0834)  SpO2: 100 % (02/28/22 0834) Vital Signs (24h Range):  Temp:  [96.7 °F (35.9 °C)-98.6 °F (37 °C)] 96.7 °F (35.9 °C)  Pulse:  [] 99  Resp:  [18-20] 18  SpO2:  [96 %-100 %] 100 %  BP: (111-145)/(69-89) 116/69     Weight: 68.5 kg (151 lb) (02/26/22 1125)  Body mass index is 22.96 kg/m².     Intake/Output Summary (Last 24 hours) at 2/28/2022 0921  Last data filed at 2/28/2022 0700  Gross per 24 hour   Intake 125 ml   Output --   Net 125 ml       Lines/Drains/Airways       Peripheral Intravenous Line  Duration                  Peripheral IV - Single Lumen 02/24/22 2030 20 G Anterior;Proximal;Right Forearm 3 days                    Physical Exam  General appearance: alert, cooperative, no distress  HENT: Normocephalic, atraumatic, neck symmetrical, no nasal discharge   Abdomen: soft, minimal tenderness RUQ and epigastrum; non-distended, dullness to percussion, bowel sounds normoactive  Neuro: A&Ox3, normal sensation, moves all extremities    Significant Labs:  CBC:   Recent Labs   Lab 02/27/22  0406 02/28/22  0354   WBC 8.34 9.01   HGB 11.4* 12.8   HCT 33.7* 36.9*    338     BMP:   Recent Labs   Lab 02/28/22  0354   *      K 4.6      CO2 25   BUN 7   CREATININE 0.7   CALCIUM 9.5     Coagulation: No results for input(s): PT, INR, APTT in the last 48 hours.      Significant Imaging:  Imaging  results within the past 24 hours have been reviewed.

## 2022-02-28 NOTE — PLAN OF CARE
Discharge orders noted. Additional clinical references attached.    Patient's discharge instructions given by bedside RN and reviewed via this VN.  Education provided on new medication, diagnosis, and follow-up appointments.  Teach back method used. Patient verbalized understanding. All questions answered. Floor nurse notified.     Problem: Adult Inpatient Plan of Care  Goal: Plan of Care Review  2/28/2022 1712 by Diana Adair RN  Outcome: Met  2/28/2022 1712 by Diana Adair RN  Outcome: Ongoing, Progressing  Goal: Patient-Specific Goal (Individualized)  2/28/2022 1712 by Diana Adair RN  Outcome: Met  2/28/2022 1712 by Diana Adair RN  Outcome: Ongoing, Progressing  Goal: Absence of Hospital-Acquired Illness or Injury  2/28/2022 1712 by Diana Adair RN  Outcome: Met  2/28/2022 1712 by Diana Adair RN  Outcome: Ongoing, Progressing  Goal: Optimal Comfort and Wellbeing  2/28/2022 1712 by Diana Adair RN  Outcome: Met  2/28/2022 1712 by Diana Adair RN  Outcome: Ongoing, Progressing  Goal: Readiness for Transition of Care  2/28/2022 1712 by Diana Adair RN  Outcome: Met  2/28/2022 1712 by Diana Adair RN  Outcome: Ongoing, Progressing     Problem: Pain Acute  Goal: Acceptable Pain Control and Functional Ability  2/28/2022 1712 by Diana Adair RN  Outcome: Met  2/28/2022 1712 by Diana Adair RN  Outcome: Ongoing, Progressing     Problem: Nausea and Vomiting  Goal: Fluid and Electrolyte Balance  2/28/2022 1712 by Diana Adair RN  Outcome: Met  2/28/2022 1712 by Diana Adair RN  Outcome: Ongoing, Progressing

## 2022-02-28 NOTE — PLAN OF CARE
Received call from medicine team for consult of status migrainosus in 24 yo Maltese speaking Female with reported hx of migraines. Current headache >24 hours, not responsive to fluids and NSAIDs. CT head unremarkable. Offered preliminary recommendations, until patient can be seen in person tomorrow.     Recommendations  -1 L IVF bolus, and continue IV maintenance fluids at a low rate   -dexamethasone 6 mg IV x1   -Toradol 15 mg IV x1  -Compazine 10 mg x1   -magnesium 1g IV x1   -do not recommend triptans for status migrainosus, as these are most beneficial for episodic migraines at the onset   -to be formally seen and staffed tomorrow 2/28/22     Mario Barrientos DO  [facility], PGY-III

## 2022-02-28 NOTE — PROGRESS NOTES
[facility] - Telemetry  Gastroenterology  Progress Note    Patient Name: Gladys Kenyon  MRN: [id number]  Admission Date: 2/24/2022  Hospital Length of Stay: 0 days  Code Status: Full Code   Attending Provider: Miki Michel MD  Consulting Provider: Alcon Gunn MD, Roderick Davenport MD  Primary Care Physician: Primary Doctor No  Principal Problem: Generalized abdominal pain      Subjective:     Interval History: Patient is doing well, states abdominal pain has improved. Denies n/v, tolerating diet well, no changes in bowel movements. Neuro following for status migrainosus.     Review of Systems  General ROS: negative for chills, fever or weight loss  Cardiovascular ROS: no chest pain or dyspnea on exertion  Gastrointestinal ROS: +abdominal pain, nausea, emesis. No change in bowel habits, or black/ bloody stools    Objective:     Vital Signs (Most Recent):  Temp: 96.7 °F (35.9 °C) (02/28/22 0834)  Pulse: 99 (02/28/22 0834)  Resp: 18 (02/28/22 0834)  BP: 116/69 (02/28/22 0834)  SpO2: 100 % (02/28/22 0834) Vital Signs (24h Range):  Temp:  [96.7 °F (35.9 °C)-98.6 °F (37 °C)] 96.7 °F (35.9 °C)  Pulse:  [] 99  Resp:  [18-20] 18  SpO2:  [96 %-100 %] 100 %  BP: (111-145)/(69-89) 116/69     Weight: 68.5 kg (151 lb) (02/26/22 1125)  Body mass index is 22.96 kg/m².     Intake/Output Summary (Last 24 hours) at 2/28/2022 0921  Last data filed at 2/28/2022 0700  Gross per 24 hour   Intake 125 ml   Output --   Net 125 ml       Lines/Drains/Airways       Peripheral Intravenous Line  Duration                  Peripheral IV - Single Lumen 02/24/22 2030 20 G Anterior;Proximal;Right Forearm 3 days                    Physical Exam  General appearance: alert, cooperative, no distress  HENT: Normocephalic, atraumatic, neck symmetrical, no nasal discharge   Abdomen: soft, minimal tenderness RUQ and epigastrum; non-distended, dullness to percussion, bowel sounds normoactive  Neuro: A&Ox3, normal sensation, moves all extremities    Significant  "Labs:  CBC:   Recent Labs   Lab 02/27/22  0406 02/28/22  0354   WBC 8.34 9.01   HGB 11.4* 12.8   HCT 33.7* 36.9*    338     BMP:   Recent Labs   Lab 02/28/22  0354   *      K 4.6      CO2 25   BUN 7   CREATININE 0.7   CALCIUM 9.5     Coagulation: No results for input(s): PT, INR, APTT in the last 48 hours.      Significant Imaging:  Imaging results within the past 24 hours have been reviewed.    Assessment/Plan:     * RUQ abdominal pain  Gladys Kenyon is a 26 y/o female with no medical history presents to [facility] ED with complaints of 3 day history of RUQ abdominal pain, gradual onset, intermittent and not related to eating or passing BM. She has had associated nausea and non bloody/non bilious vomiting. GI consulted for CT AP findings which showed hepatomegaly with onspecific periportal edema and heterogeneous parenchymal attenuation/slight "nutmeg" liver configuration. Duplex liver US showed patent flow in hepatic arterial system, hep veins, protal vein and IVC and no gall bladder pathology. No signs of liver failure or biliary obstruction. HDS, afebrile. 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     Stable Hb, normal WBC, normal plts, normal INR. Normal T bili, AST/ALT, AlkPhos. Normal lipase on admit.    Plan  - Continue symptomatic care  - Will need follow up with [facility] for possible liver biopsy    Thank you for your consult. I will sign off. Please contact us if you have any additional questions.      Alcon Gunn MD ([place])  [facility] PGY 1        "

## 2022-02-28 NOTE — PLAN OF CARE
"Plan of care reviewed with the patient. Scheduled medicines given and the patient tolerated well. Fall and safety precautions are in place. Patient is on 0.9% NS Continuous infusion @ 75 ml/hr.  On Telemetry monitoring with NSR, no true "red alarms" noted in the shift. Given Ativan 1mg IV once on the orders of NP Ms.Joy RONY Pritchard. No acute distress noted in the shift. Advised the patient to call for assistance. Continued monitoring the patient.  "

## 2022-02-28 NOTE — SIGNIFICANT EVENT
Code stroke activated for reported numbness of right side of face and tongue numbness. Dr. Batista and I are bedside. On exam, patient appears to be very uncomfortable, has tenderness to her right side of face. No focal deficits. She is Azeri speaking and bedside nurse is assisting in communication. Patient does not have dysarthria. CBG 90. Patient has history of migraines and is not on any medications. She has been receiving Motrin and Toradol with minimal relief. Code stroke was called yesterday for similar symptoms and her CT head was negative. Code stroke was ended at 1742.      Assessment/Plan:    Numbness to right side of face/tongue  -unlikely stroke as her CT head was negative yesterday; no focal deficits on exam  -CBG 90  -'s/80's at bedside  -She has history of migraines and does not take any medications for it  -Will consult neurology for evaluation and recommendations  -Will continue to monitor            Shweta Cuellar, EYN, ACNPC-AG, CCRN  Hospitalist  Department of Hospital Medicine  [facility]   [phone]

## 2022-02-28 NOTE — CONSULTS
"NEUROLOGY FLOOR CONSULT    Reason for consult:  Migraines, R facial numbness, thick tongue sensation     Informant:  Chart review        Other sources of information : patient    CC:  "Headaches associated with facial numbness and thick tongue sensation"    HPI:   lGadys Kenyon is a 27 y.o. year old lady with medical history significant for anemia, and migraines who presented to the hospital for the evaluation of abdominal pain associated with nausea and vomiting. She is been followed by the GI for the evaluation of abdominal pain. Yesterday, around 6 PM, code stroke was activated for R face numbness and tongue numbness for which code stroke was activated, CTH showed no acute change, and it was deemed that this is secondary to Migraines. Her NIHSS was 0.  On today's interview, patient reports that she is doing better and has not had any headaches since this morning. She states that she would usually have temporal headaches, throbbing in nature, mostly during her menstrual cycle, associated with nausea, vomiting, visual aura, numbness and tingling sensation. She reports that she has had one episode a year ago which required an ED visit with similar presentation.   She now complaints no headaches, nausea or vomiting. No numbness or tingling sensation.         Histories:     Allergies:  Patient has no known allergies.    Current Medications:    Current Facility-Administered Medications   Medication Dose Route Frequency Provider Last Rate Last Admin    acetaminophen tablet 650 mg  650 mg Oral Q4H PRN Shwetagemma Cuellar, NP   650 mg at 02/26/22 1449    aluminum-magnesium hydroxide-simethicone 200-200-20 mg/5 mL suspension 30 mL  30 mL Oral QID PRN Shwetagemma Ramos-Remberto, NP   30 mL at 02/27/22 1554    dextrose 10% bolus 125 mL  12.5 g Intravenous PRN Loreto Stapleton MD        dextrose 10% bolus 250 mL  25 g Intravenous PRN Loreto Stapleton MD        glucagon (human recombinant) injection 1 mg  1 mg Intramuscular " PRN Shweta T. Alisa, NP        glucose chewable tablet 16 g  16 g Oral PRN Shweta T. Alisa, NP        glucose chewable tablet 24 g  24 g Oral PRN Shweta T. Alisa, REG        ibuprofen tablet 400 mg  400 mg Oral Q6H PRN Ben Q. MD Lester   400 mg at 02/27/22 1553    melatonin tablet 6 mg  6 mg Oral Nightly PRN Shweta T. REG Cuellar        ondansetron injection 4 mg  4 mg Intravenous Q8H PRN Shweta T. Alisa, NP   4 mg at 02/26/22 1031    pantoprazole EC tablet 40 mg  40 mg Oral Daily Ben Q. MD Lester   40 mg at 02/28/22 0951    prochlorperazine injection Soln 5 mg  5 mg Intravenous Q6H PRN Shweta T. Rachel-Remberto, NP   5 mg at 02/27/22 1554    simethicone chewable tablet 80 mg  1 tablet Oral QID PRN Shweta T. Alisa, NP   80 mg at 02/26/22 1449    sodium chloride 0.9% flush 10 mL  10 mL Intravenous Q8H PRN Shweta T. REG Cuellar           Past Medical/Surgical/Family History:  Medical:   Past Medical History:   Diagnosis Date    No known problems       Surgeries:   Past Surgical History:   Procedure Laterality Date    none        Family:   Family History   Problem Relation Age of Onset    Hypertension Mother     Hypertension Father        Social History:    Substance Abuse/Dependence History:  Tobacco: denied  EtOH: none  Ilicits: None      Current Evaluation:     Vital Signs:   Vitals:    02/28/22 1200   BP:    Pulse: 80   Resp:    Temp:           ORIENTATION: Within Normal Limits    MEMORY: recent and remote memory intact    LANGUAGE: 0=No aphasia, normal    CRANIAL NERVES:  normal    MOTOR:  Pronator drift: absent    Strength 5/5     Tone: normal    DTR'S:  2+ bilaterally    SENSORY:  normal to light touch .    CEREBELLAR/GAIT:  Finger to nose:normal  Heel to shin:normal  Gait: Normal    LABORATORY STUDIES:  Recent Results (from the past 24 hour(s))   POCT glucose    Collection Time: 02/27/22  4:28 PM   Result Value Ref Range    POCT Glucose 66 (L) 70 - 110 mg/dL   POCT glucose     Collection Time: 02/27/22  4:30 PM   Result Value Ref Range    POCT Glucose 90 70 - 110 mg/dL   POCT glucose    Collection Time: 02/27/22  5:43 PM   Result Value Ref Range    POCT Glucose 133 (H) 70 - 110 mg/dL   CBC Auto Differential    Collection Time: 02/28/22  3:54 AM   Result Value Ref Range    WBC 9.01 3.90 - 12.70 K/uL    RBC 3.82 (L) 4.00 - 5.40 M/uL    Hemoglobin 12.8 12.0 - 16.0 g/dL    Hematocrit 36.9 (L) 37.0 - 48.5 %    MCV 97 82 - 98 fL    MCH 33.5 (H) 27.0 - 31.0 pg    MCHC 34.7 32.0 - 36.0 g/dL    RDW 12.3 11.5 - 14.5 %    Platelets 338 150 - 450 K/uL    MPV 9.4 9.2 - 12.9 fL    Immature Granulocytes 0.4 0.0 - 0.5 %    Gran # (ANC) 7.4 1.8 - 7.7 K/uL    Immature Grans (Abs) 0.04 0.00 - 0.04 K/uL    Lymph # 1.3 1.0 - 4.8 K/uL    Mono # 0.3 0.3 - 1.0 K/uL    Eos # 0.0 0.0 - 0.5 K/uL    Baso # 0.02 0.00 - 0.20 K/uL    nRBC 0 0 /100 WBC    Gran % 82.3 (H) 38.0 - 73.0 %    Lymph % 13.9 (L) 18.0 - 48.0 %    Mono % 3.2 (L) 4.0 - 15.0 %    Eosinophil % 0.0 0.0 - 8.0 %    Basophil % 0.2 0.0 - 1.9 %    Differential Method Automated    Comprehensive Metabolic Panel    Collection Time: 02/28/22  3:54 AM   Result Value Ref Range    Sodium 141 136 - 145 mmol/L    Potassium 4.6 3.5 - 5.1 mmol/L    Chloride 107 95 - 110 mmol/L    CO2 25 23 - 29 mmol/L    Glucose 116 (H) 70 - 110 mg/dL    BUN 7 6 - 20 mg/dL    Creatinine 0.7 0.5 - 1.4 mg/dL    Calcium 9.5 8.7 - 10.5 mg/dL    Total Protein 7.2 6.0 - 8.4 g/dL    Albumin 4.1 3.5 - 5.2 g/dL    Total Bilirubin 0.3 0.1 - 1.0 mg/dL    Alkaline Phosphatase 70 55 - 135 U/L    AST 18 10 - 40 U/L    ALT 33 10 - 44 U/L    Anion Gap 9 8 - 16 mmol/L    eGFR if African American >60 >60 mL/min/1.73 m^2    eGFR if non African American >60 >60 mL/min/1.73 m^2   Basic metabolic panel    Collection Time: 02/28/22  1:22 PM   Result Value Ref Range    Sodium 138 136 - 145 mmol/L    Potassium 3.4 (L) 3.5 - 5.1 mmol/L    Chloride 106 95 - 110 mmol/L    CO2 24 23 - 29 mmol/L    Glucose 114  (H) 70 - 110 mg/dL    BUN 8 6 - 20 mg/dL    Creatinine 0.7 0.5 - 1.4 mg/dL    Calcium 9.2 8.7 - 10.5 mg/dL    Anion Gap 8 8 - 16 mmol/L    eGFR if African American >60 >60 mL/min/1.73 m^2    eGFR if non African American >60 >60 mL/min/1.73 m^2       Thyroid 0.806   Vit B12: 768  Folate:  10.8    RADIOLOGY STUDIES:  I have personally reviewed the images performed.     HEAD CT: No acute intracranial abnormality.        Assessment:  DEANNA Kenyon is a 27 y.o. year old lady with medical history significant for anemia, and migraines who is here for abdominal pain associated with nausea and vomiting s/p GI evaluation and workup was stroke activated yesterday for acute onset R face numbness and tongue numbness associated with headaches (once of none a month) which usually happens during her menstrual cycle.  On examination she has no weakness/numbness, and she has normal EOM. The etiology of headaches leading to numbness and tingling sensation could be secondary to migraines with aura with out status migrainosus responsive to Ibuprofen.     Treatment Plan  S/p Headache cocktail  PRN Ibuprofen and antiemetics for headaches   Hydration and headache hygiene   Follow up with PCP   Supplement with B12  Recommended to seek medical attention if headaches are persistent, and positional associated with vision deficits.       Differential diagnosis was explained to the patient. All questions were answered. Patient understood and agreed to adhere to plan.       Case discussed with Dr. Dumont       We will sign off. Please call us if you have any questions or concerns.     Appreciate the consult.          Cierra Danielle-PGY III  [employer] [facility]

## 2022-02-28 NOTE — ASSESSMENT & PLAN NOTE
"Gladys Kenyon is a 28 y/o female with no medical history presents to [facility] ED with complaints of 3 day history of RUQ abdominal pain, gradual onset, intermittent and not related to eating or passing BM. She has had associated nausea and non bloody/non bilious vomiting. GI consulted for CT AP findings which showed hepatomegaly with onspecific periportal edema and heterogeneous parenchymal attenuation/slight "nutmeg" liver configuration. Duplex liver US showed patent flow in hepatic arterial system, hep veins, protal vein and IVC and no gall bladder pathology. No signs of liver failure or biliary obstruction. HDS, afebrile. 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     Stable Hb, normal WBC, normal plts, normal INR. Normal T bili, AST/ALT, AlkPhos. Normal lipase on admit.    Plan  - Continue symptomatic care  - Will need follow up with [facility] for possible liver biopsy  "

## 2022-02-28 NOTE — PLAN OF CARE
02/28/22 1513   Final Note   Assessment Type Final Discharge Note   Anticipated Discharge Disposition Home   What phone number can be called within the next 1-3 days to see how you are doing after discharge? [phone]   Hospital Resources/Appts/Education Provided Appointments scheduled and added to AVS   Post-Acute Status   Discharge Delays None known at this time

## 2022-02-28 NOTE — PLAN OF CARE
SW visited patient to complete assessment with assistance from  Jazlyn ID#[id number].  Pt alert and oriented during time of visit. Pt's demographic information was verified. Pt denies usage of DME and reported she is fully independent with all ADL's. Pt does not drive, and advised s/o Taz Paredesdo ([phone]) will provide transportation to family home at discharge. Pt reported no difficulties affording medication and no needs for community resources at this time. SW completed whiteboard and left discharge brochure. Pt was encouraged to call with any questions or concerns. Case management will continue to follow pt's transitions of care.       Future Appointments   Date Time Provider Department Center   3/14/2022  2:00 PM Bernadine Mojica NP [facility]   3/22/2022  1:00 PM Fariha Queen MD [facility]          02/28/22 2079   Discharge Assessment   Assessment Type Discharge Planning Assessment   Confirmed/corrected address, phone number and insurance Yes   Confirmed Demographics Correct on Facesheet   Source of Information family; utilized  (Jazlyn ID# [id number])   When was your last doctors appointment?   (Pt could not remember last MD visit.)   Communicated KENDRICK with patient/caregiver Date not available/Unable to determine   Reason For Admission Abnormal Liver CT, Chest pain & Abdominal pain   Lives With other (see comments)  (Pt reported she lives with family)   Do you expect to return to your current living situation? Yes   Do you have help at home or someone to help you manage your care at home? Yes   Who are your caregiver(s) and their phone number(s)? Taz Paredesdo [phone] ( Significant other )   Prior to hospitilization cognitive status: Alert/Oriented   Current cognitive status: Alert/Oriented   Walking or Climbing Stairs Difficulty none   Dressing/Bathing Difficulty none   Home Accessibility stairs to enter home   Number of Stairs, Main Entrance two    Home Layout Able to live on 1st floor   Equipment Currently Used at Home none   Do you currently have service(s) that help you manage your care at home? No   Do you have any problems affording any of your prescribed medications? No  (Pt obtains RX affordably at [facility] pharmacy)   Who is going to help you get home at discharge? Significant other Taz Hamilton [phone]   How do you get to doctors appointments? family or friend will provide   Are you on dialysis? No   Do you take coumadin? No   Discharge Plan A Home with family   DME Needed Upon Discharge  other (see comments)  (TBD)   Discharge Barriers Identified None

## 2022-02-28 NOTE — PLAN OF CARE
Discharged orders noted.  IV and tele monitor removed.  Home medications delivered to bedside. Transport requested.  Will continue to monitor.

## 2022-03-02 LAB
H PYLORI IGG SERPL QL IA: POSITIVE
HAV IGG SER QL IA: POSITIVE
HBV CORE AB SERPL QL IA: NEGATIVE
HBV SURFACE AB SER-ACNC: NEGATIVE M[IU]/ML
HIV 1+2 AB+HIV1 P24 AG SERPL QL IA: NEGATIVE

## 2022-03-02 NOTE — DISCHARGE SUMMARY
[facility]  Discharge Summary  Hospital Medicine    Patient Name: Gladys Kenyon  YOB: 1995    Admit Date: 2/24/2022    Discharge Date and Time: 2/28/2022  5:31 PM    Discharge Attending Physician: Miki Michel MD     Team: Networked reference to record PCT     Reason for Admission:   Chief Complaint   Patient presents with    Abdominal Pain     Generalized abd pain with n/v that began last night, last ate-3 hours ago, last BM this am, denies pain with urination, pain describes as cramping        Active Hospital Problems    Diagnosis  POA    Anemia [D64.9]  Yes      Resolved Hospital Problems    Diagnosis Date Resolved POA    *Generalized abdominal pain [R10.84] 02/28/2022 Yes    Migraine with aura and without status migrainosus, not intractable [G43.109] 02/28/2022 No    Nausea and vomiting [R11.2] 02/28/2022 Yes       HPI:   Gladys Kenyon is a 26 y/o female with no known medical history presents to [facility] ED with complaints of abdominal pain. Patient reports she has been having intermittent and intense abdominal pain since yesterday but today her abdominal pain has progressively worsened. Patient reports abdominal pain is generalized and radiates to lower abdomen and right upper quadrant. Patient reports abdominal pain feels like cramps, is constant, and rates pain 8/10 on numeric pain scale. Patient reports she also had sudden nausea and vomiting around 4pm today. Patient denies bloody stools, diarrhea, or reynold-colored stools but state she had a burning sensation with her bowel movement today. Patient tried to take Pepto-Bismol but threw it up. Patient reports she is on her menstrual cycle and usually have mild abdominal cramping but the abdominal pain she is experiencing is worse than menstrual cramps. Patient denies fever, chills, SOB, chest pain, cough, palpitations, leg swelling, or urinary symptoms. Patient denies family history of liver disease. Patient denies alcohol or drug  use. Patient does not take any medications. 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 GI consulted. Patient will be placed into hospital medicine observation services under my care in collaboration with Dr. Radha Garrido.     Hospital Course:   2/25- today her pain is 5/10 which is improved from 10/10.  No vomiting today.  GI following, will f/u with their recommendations. Will monitor condition and monitor labs.      2/26- had [place] speaking nurse at bedside today to assist, she is having pain after she eats solid, was fine last night but this morning had strawberries and started having abd pain again, RUQ and epigastric.  10/10 pain without morphine 2mg and 1/10 pain after morphine.  Frequency is good lasting 4-5 hrs.  She doesn't ask for much pain meds per nurse.  Last dose was overnight.  LFTs continue to be wnl.  HIV pending.  I will change her diet to clears.  Continue to monitor for improvement, get better pain control, and f/u with GI reccs and plans.       2/27- pt last night had a stroke code called for blurred vision, facial numbness, jaw pain, ct head was normal, was given toradol and ivf.  She is still having headaches.  Does have h/o migraines that she said she takes ibuprofen for.   I witness her eating clear liquids and has not had pain from it and will try soft food today.  If pain is better control and if can tolerate soft diet we can start planning dc with [place] [facility] [place] [place].      2/28- Neurology evaluated the patient and recommended to continue NSAIDs and prn antiemetics. Patient's migraines were controlled. She was discharge home with outpatient neurology followup.    Principal Problem: Generalized abdominal pain      Procedures Performed: * No surgery found *    Special Care, Treatment, and Services Provided: none    Consults: Neurology    Significant Diagnostic Studies:  EF   Date Value Ref Range Status   02/26/2022 55 % Final     No results found for:  HGBA1C  CBC:   Recent Labs   Lab 02/28/22  0354   WBC 9.01   RBC 3.82*   HGB 12.8   HCT 36.9*      MCV 97   MCH 33.5*   MCHC 34.7     CMP:   Recent Labs   Lab 02/28/22  0354 02/28/22  1322   * 114*   CALCIUM 9.5 9.2   ALBUMIN 4.1  --    PROT 7.2  --     138   K 4.6 3.4*   CO2 25 24    106   BUN 7 8   CREATININE 0.7 0.7   ALKPHOS 70  --    ALT 33  --    AST 18  --    BILITOT 0.3  --      LFTs:   Recent Labs   Lab 02/28/22  0354   ALT 33   AST 18   ALKPHOS 70   BILITOT 0.3   PROT 7.2   ALBUMIN 4.1     Coagulation:   Recent Labs   Lab 02/24/22  1734   LABPROT 10.3   INR 1.0     Labs: All labs within the past 24 hours have been reviewed  Radiology:   Imaging Results          US Liver with Doppler (xpd) (Final result)  Result time 02/24/22 18:58:01    Final result by Todd Iglesias DO (02/24/22 18:58:01)                 Impression:      1. Satisfactory Doppler evaluation of the hepatic parenchyma.  2. Pericholecystic fluid, likely reactive.  No cholelithiasis or sonographic evidence of acute cholecystitis.      Electronically signed by: Todd Iglesias  Date:    02/24/2022  Time:    18:58             Narrative:    EXAMINATION:  US LIVER WITH DOPPLER    CLINICAL HISTORY:  Abnormal CT Liver.;    TECHNIQUE:  Duplex scan of the liver was performed using B-mode/gray scale imaging and Doppler spectral analysis and color flow.    COMPARISON:  CT of the abdomen and pelvis from earlier the same date.    FINDINGS:  The liver measures 17.3 cm, upper limits of normal in size and demonstrates normal echotexture.  There is no focal hepatic parenchymal abnormality. No intra or extrahepatic bile duct dilatation. The common duct measures 2 mm.    The middle, right, and left hepatic veins are patent and unremarkable. The main, right, and left branches of the portal vein demonstrate hepatopedal flow and are unremarkable.    The pancreas and visualized aorta are unremarkable.  There is a small amount of  "pericholecystic fluid.  The gallbladder is otherwise unremarkable without wall thickening or cholelithiasis.  The spleen measures 10.0 x 3.9 cm and is unremarkable.    The hepatic arterial system is unremarkable. The celiac artery is unremarkable.    The IVC is unremarkable. The bilateral kidneys are unremarkable. There is no ascites.                                CT Abdomen Pelvis With Contrast (Final result)  Result time 02/24/22 16:30:45    Final result by Demario Rodriguez MD (02/24/22 16:30:45)                 Impression:      1. No localized findings identified to explain patient's symptoms of right lower quadrant pain.  Specifically, appendix and terminal ileum appear normal.  2. Hepatomegaly with nonspecific periportal edema and heterogeneous parenchymal attenuation/slight "nutmeg" liver configuration.  Differential considerations more commonly seen in this age group can include sequela of acute hepatitis versus hepatic veno-occlusive versus sequela of Budd-Chiari syndrome/hepatic venous and/or IVC thrombosis, although difficult to confirm secondary to timing of contrast bolus and image acquisition.  Correlation with LFTs advised.  Sequela of congestive hepatopathy from CHF, constrictive pericarditis or pulmonary hypertension considered much less likely given the relative normal appearance of the base of the heart and lack of any significant basilar pulmonary edema or pleural effusion.  No discrete mass seen at the domitila hepatis to suggest a cause for potential obstruction of lymphatic drainage, and no discrete mass seen within the biliary system.  No definitive CT morphologic findings to suggest primary sclerosing cholangitis at this time.  3. Left hepatic lobe subcentimeter enhancing focus, too small to characterize.  4. Few additional findings as above.  This report was flagged in Epic as abnormal.      Electronically signed by: Demario Rodriguez MD  Date:    02/24/2022  Time:    16:30             Narrative:    " EXAMINATION:  CT ABDOMEN PELVIS WITH CONTRAST    CLINICAL HISTORY:  RLQ abdominal pain (Age >= 14y);    TECHNIQUE:  Low dose axial images, sagittal and coronal reformations were obtained from the lung bases to the pubic symphysis following the IV administration of 75 mL of Omnipaque 350 .  Oral contrast was not given.    COMPARISON:  None.    FINDINGS:  Imaged lung bases are clear.  Base of the heart is within normal limits.    Liver is enlarged measuring 23 cm in maximum coronal length.  There is heterogeneous parenchymal attenuation with somewhat mottled pattern of contrast enhancement and patchy areas of decreased enhancement of the liver periphery (nutmeg appearance) throughout the liver.  There is diffuse periportal edema.  Small geographic area of hypoattenuation at the anterior left hepatic lobe suggesting focal fatty infiltration.  2 mm enhancing focus at the lateral left hepatic lobe, which is too small to characterize.    Main portal vein appears patent.  Evaluation of the IVC is limited secondary to timing of contrast bolus mainly tailored for the portal venous system.  Hepatic veins could not be assessed.    Gallbladder appears somewhat contracted with suspected pericholecystic fluid versus asymmetric wall thickening along the lateral aspect.  No radiodense large gallstone seen.  No biliary ductal dilatation.    Pancreas, spleen, stomach, duodenum and bilateral adrenal glands are within normal limits.    Bilateral kidneys are normal in size, shape and location with relatively symmetric enhancement.  No hydronephrosis or significant perinephric stranding.  Ureters are nondilated.  Urinary bladder is suboptimally distended.  Uterus is retroflexed and tilted towards the right.  No adnexal mass definitively seen.  There is suspected small volume nonspecific free pelvic fluid, commonly physiologic in this age group.    No large volume abdominal ascites, free air or lymphadenopathy by CT criteria.    Appendix  "and terminal ileum are within normal limits.  Mild amount of scattered fecal material throughout the colon.  No evidence of bowel obstruction or inflammation.  No pneumatosis or portal venous gas.    Small fat containing umbilical hernia.    Osseous structures show minimal degenerative change without acute or destructive process seen.                                    Final Diagnoses: Same as principal problem.    Discharged Condition: good  Face to face services were provided on 3/2/2022   Time Spent:  I spent 45 minutes on the discharge, which included reviewing hospital course with patient/family, reviewing discharge medications, and arranging follow-up care.  Physical Exam on 3/2/2022:  /65 (BP Location: Right arm, Patient Position: Lying)   Pulse 80   Temp 97.1 °F (36.2 °C) (Oral)   Resp 18   Ht 5' 8" (1.727 m)   Wt 68.5 kg (151 lb)   SpO2 98%   Breastfeeding No   BMI 22.96 kg/m²       Disposition: Home or Self Care    Follow Up Instructions:     Future Appointments   Date Time Provider Department Center   3/14/2022  2:00 PM Bernadine Mojica NP [facility]   3/22/2022  1:00 PM Fariha Queen MD [facility]       Medications:  Reconciled Home Medications:      Medication List      START taking these medications    hydrOXYzine HCL 10 MG Tab  Commonly known as: ATARAX  Hachita 1 tableta (10 mg en total) por vía oral 3 (negrita) veces al día según sea necesario (ansiedad).  (Take 1 tablet (10 mg total) by mouth 3 (three) times daily as needed (anxiety).)     ibuprofen 400 MG tablet  Commonly known as: ADVIL,MOTRIN  Take 1 tablet (400 mg total) by mouth every 6 (six) hours as needed (headaches).     metoclopramide HCl 10 MG tablet  Commonly known as: REGLAN  Take 1 tablet (10 mg total) by mouth every 8 (eight) hours as needed (nausea and migraines).     pantoprazole 40 MG tablet  Commonly known as: PROTONIX  Hachita monica tableta (40 mg en total) por vía oral diariamente.  (Take 1 " tablet (40 mg total) by mouth once daily.)        CONTINUE taking these medications    acetaminophen 325 MG tablet  Commonly known as: TYLENOL  Take 325 mg by mouth as needed (headache).          Discharge Medication List as of 2/28/2022  4:54 PM      START taking these medications    Details   hydrOXYzine HCL (ATARAX) 10 MG Tab Take 1 tablet (10 mg total) by mouth 3 (three) times daily as needed (anxiety)., Starting Mon 2/28/2022, Normal      ibuprofen (ADVIL,MOTRIN) 400 MG tablet Take 1 tablet (400 mg total) by mouth every 6 (six) hours as needed (headaches)., Starting Mon 2/28/2022, Normal      metoclopramide HCl (REGLAN) 10 MG tablet Take 1 tablet (10 mg total) by mouth every 8 (eight) hours as needed (nausea and migraines)., Starting Mon 2/28/2022, Normal      pantoprazole (PROTONIX) 40 MG tablet Take 1 tablet (40 mg total) by mouth once daily., Starting Tue 3/1/2022, Until Wed 3/1/2023, Normal         CONTINUE these medications which have NOT CHANGED    Details   acetaminophen (TYLENOL) 325 MG tablet Take 325 mg by mouth as needed (headache)., Historical Med             Discharge Instructions:  Discharge Procedure Orders   Ambulatory referral/consult to Hepatology   Standing Status: Future   Referral Priority: Routine Referral Type: Consultation   Referral Reason: Specialty Services Required   Requested Specialty: Hepatology   Number of Visits Requested: 1     Ambulatory referral/consult to Neurology   Standing Status: Future   Referral Priority: Routine Referral Type: Consultation   Referral Reason: Specialty Services Required   Requested Specialty: Neurology   Number of Visits Requested: 1     Diet Adult Regular     Notify your health care provider if you experience any of the following:  temperature >100.4     Notify your health care provider if you experience any of the following:  persistent nausea and vomiting or diarrhea     Notify your health care provider if you experience any of the following:   severe uncontrolled pain     Notify your health care provider if you experience any of the following:  redness, tenderness, or signs of infection (pain, swelling, redness, odor or green/yellow discharge around incision site)     Notify your health care provider if you experience any of the following:  difficulty breathing or increased cough     Notify your health care provider if you experience any of the following:  severe persistent headache     Notify your health care provider if you experience any of the following:  worsening rash     Notify your health care provider if you experience any of the following:  persistent dizziness, light-headedness, or visual disturbances     Notify your health care provider if you experience any of the following:  increased confusion or weakness     Activity as tolerated         Miki Michel MD  Department of Hospital Medicine

## 2022-03-03 DIAGNOSIS — B96.81 CHRONIC HELICOBACTER PYLORI GASTRITIS: Primary | ICD-10-CM

## 2022-03-03 DIAGNOSIS — K29.50 CHRONIC HELICOBACTER PYLORI GASTRITIS: Primary | ICD-10-CM

## 2022-03-03 LAB
HBV E AB SER QL: NONREACTIVE
HBV E AG SERPL QL IA: NONREACTIVE

## 2022-03-03 RX ORDER — CLARITHROMYCIN 500 MG/1
500 TABLET, FILM COATED ORAL 2 TIMES DAILY
Qty: 28 TABLET | Refills: 0 | Status: SHIPPED | OUTPATIENT
Start: 2022-03-03 | End: 2022-03-17

## 2022-03-03 RX ORDER — AMOXICILLIN 500 MG/1
1000 CAPSULE ORAL 2 TIMES DAILY
Qty: 56 CAPSULE | Refills: 0 | Status: SHIPPED | OUTPATIENT
Start: 2022-03-03 | End: 2022-03-17

## 2022-03-03 RX ORDER — PANTOPRAZOLE SODIUM 40 MG/1
40 TABLET, DELAYED RELEASE ORAL 2 TIMES DAILY
Qty: 28 TABLET | Refills: 0 | Status: SHIPPED | OUTPATIENT
Start: 2022-03-03 | End: 2022-03-17

## 2022-03-04 ENCOUNTER — TELEPHONE (OUTPATIENT)
Dept: GASTROENTEROLOGY | Facility: CLINIC | Age: 27
End: 2022-03-04

## 2022-03-04 NOTE — TELEPHONE ENCOUNTER
----- Message from Nancy Davis MA sent at 3/4/2022  2:33 PM CST -----    ----- Message -----  From: Roderick Davenport MD  Sent: 3/3/2022   9:31 PM CST  To: Issac HUERTA Staff    Please inform with translation  The blood work POSITIVE for an infection called H pylori. This is a chronic bacteria that lives in the stomach.    We will need to treat it with a few different medications. I will send these medications to your pharmacy.  To [facility] pharmacy  3 different meds, amoxicillin, clarithromycin and protonix  Please be sure to take ALL the medications as directed and complete the whole prescriptions. Be sure to drink lots of water.  We will need to see you for follow up to see if the bacteria is completely gone, in about 6 weeks.     Please make follow up in 6 weeks  Please be sure to remind her of upcoming liver team appt - 3/14, this is important and she needs to keep this appt.         
Spoke to patient discuss her results and schedule her a follow up in 6 weeks on 04/26/2022 at 3:20 pm,verbal understanding  
no